# Patient Record
Sex: MALE | Race: WHITE | Employment: FULL TIME | URBAN - METROPOLITAN AREA
[De-identification: names, ages, dates, MRNs, and addresses within clinical notes are randomized per-mention and may not be internally consistent; named-entity substitution may affect disease eponyms.]

---

## 2019-03-01 ENCOUNTER — HOSPITAL ENCOUNTER (EMERGENCY)
Facility: CLINIC | Age: 54
Discharge: HOME | End: 2019-03-01
Attending: PHYSICIAN ASSISTANT | Admitting: PHYSICIAN ASSISTANT
Payer: COMMERCIAL

## 2019-03-01 ENCOUNTER — APPOINTMENT (OUTPATIENT)
Dept: CT IMAGING | Facility: CLINIC | Age: 54
End: 2019-03-01
Attending: PHYSICIAN ASSISTANT
Payer: COMMERCIAL

## 2019-03-01 VITALS
DIASTOLIC BLOOD PRESSURE: 70 MMHG | HEIGHT: 78 IN | RESPIRATION RATE: 14 BRPM | SYSTOLIC BLOOD PRESSURE: 112 MMHG | OXYGEN SATURATION: 96 % | TEMPERATURE: 98.7 F | HEART RATE: 144 BPM | BODY MASS INDEX: 24.3 KG/M2 | WEIGHT: 210 LBS
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
## 2019-03-01 DIAGNOSIS — S09.90XA HEAD INJURY, INITIAL ENCOUNTER: ICD-10-CM

## 2019-03-01 DIAGNOSIS — S01.111A COMPLEX LACERATION OF RIGHT EYEBROW, INITIAL ENCOUNTER: ICD-10-CM

## 2019-03-01 PROCEDURE — 93005 ELECTROCARDIOGRAM TRACING: CPT | Mod: 59

## 2019-03-01 PROCEDURE — 70450 CT HEAD/BRAIN W/O DYE: CPT

## 2019-03-01 PROCEDURE — 90471 IMMUNIZATION ADMIN: CPT

## 2019-03-01 PROCEDURE — 25000128 H RX IP 250 OP 636: Performed by: PHYSICIAN ASSISTANT

## 2019-03-01 PROCEDURE — 90715 TDAP VACCINE 7 YRS/> IM: CPT | Performed by: PHYSICIAN ASSISTANT

## 2019-03-01 PROCEDURE — 12013 RPR F/E/E/N/L/M 2.6-5.0 CM: CPT

## 2019-03-01 PROCEDURE — 99284 EMERGENCY DEPT VISIT MOD MDM: CPT | Mod: 25

## 2019-03-01 RX ORDER — GABAPENTIN 100 MG/1
100 CAPSULE ORAL 3 TIMES DAILY
COMMUNITY

## 2019-03-01 RX ORDER — TRAMADOL HYDROCHLORIDE 50 MG/1
100 TABLET ORAL 2 TIMES DAILY
COMMUNITY

## 2019-03-01 RX ORDER — CEPHALEXIN 500 MG/1
500 CAPSULE ORAL 4 TIMES DAILY
Qty: 20 CAPSULE | Refills: 0 | Status: ON HOLD | OUTPATIENT
Start: 2019-03-01 | End: 2019-06-10

## 2019-03-01 RX ORDER — DULOXETIN HYDROCHLORIDE 60 MG/1
60 CAPSULE, DELAYED RELEASE ORAL DAILY
COMMUNITY
End: 2019-06-07

## 2019-03-01 RX ADMIN — CLOSTRIDIUM TETANI TOXOID ANTIGEN (FORMALDEHYDE INACTIVATED), CORYNEBACTERIUM DIPHTHERIAE TOXOID ANTIGEN (FORMALDEHYDE INACTIVATED), BORDETELLA PERTUSSIS TOXOID ANTIGEN (GLUTARALDEHYDE INACTIVATED), BORDETELLA PERTUSSIS FILAMENTOUS HEMAGGLUTININ ANTIGEN (FORMALDEHYDE INACTIVATED), BORDETELLA PERTUSSIS PERTACTIN ANTIGEN, AND BORDETELLA PERTUSSIS FIMBRIAE 2/3 ANTIGEN 0.5 ML: 5; 2; 2.5; 5; 3; 5 INJECTION, SUSPENSION INTRAMUSCULAR at 16:33

## 2019-03-01 ASSESSMENT — ENCOUNTER SYMPTOMS
LIGHT-HEADEDNESS: 1
NECK PAIN: 0
VOMITING: 0
MYALGIAS: 0
NECK STIFFNESS: 0
FEVER: 0
HEADACHES: 1
ARTHRALGIAS: 0
WOUND: 1
NAUSEA: 0

## 2019-03-01 ASSESSMENT — MIFFLIN-ST. JEOR: SCORE: 1925.8

## 2019-03-01 NOTE — ED PROVIDER NOTES
History     Chief Complaint:  Fall     HPI   Nigel Diaz is a 53 year old male with a history of atrial fibrillation, not anticoagulated, who presents to the emergency department today for evaluation and assessment in the setting of a recent head laceration. The patient reports that this occurred approximately 15 hours ago, around 2200 yesterday evening when he was walking home from dinner and slipped on a patch of ice resulting in a fall and laceration to his right eyebrow. The patient did not sustain a loss of conscious and denies further injury, but has since had a constant headache. He notes that he was not aware of the swelling or overall severity of this injury until 5+ hours after this incident, and this morning flew home from [place] prior to presenting here for evaluation. Here the patient reports ongoing headache and lightheadedness but has not been nauseous or confused. No vomiting or blurred vision, although he reports that he was unable to put his contacts in today due to excessive swelling in his right eye. The patient is otherwise healthy and sure footed at baseline. He is not on blood thinners and denies any further symptoms or concerns at this time. The patient has been taking ibuprofen for symptom management at home and last took this yesterday evening. Latest tetanus shot dated 2/3/09.     Allergies:  No known drug allergies    Medications:    The patient is not currently taking any prescribed medications.     Past Medical History:    Atrial fibrillation   GERD  Chondromalacia of patella  ETOH abuse     Past Surgical History:    Cystectomy branchial cleft   Removal of sperm ducts     Family History:    Thyroid disease   Heart disease, father     Social History:  The patient was accompanied to the ED by family.  Smoking Status: Never  Smokeless Tobacco: Never  Alcohol Use: Yes  Marital Status:   [2]    Review of Systems   Constitutional: Negative for fever.   Eyes: Negative for visual  "disturbance.   Gastrointestinal: Negative for nausea and vomiting.   Musculoskeletal: Negative for arthralgias, myalgias, neck pain and neck stiffness.   Skin: Positive for wound.   Neurological: Positive for light-headedness and headaches. Negative for syncope.   All other systems reviewed and are negative.    Physical Exam     Patient Vitals for the past 24 hrs:   BP Temp Temp src Pulse Resp SpO2 Height Weight   03/01/19 1749 112/70 -- -- 144 -- -- -- --   03/01/19 1531 143/89 98.7  F (37.1  C) Oral 118 14 96 % 1.981 m (6' 6\") 95.3 kg (210 lb)     Physical Exam  General: Alert, interactive. Well appearing, in no distress.   Head:  4 cm full thickness horizontal laceration above right eyebrow with surrounding periorbital swelling and bruising.     Eyes:  EOM intact. The pupils are equal, round, and reactive to light. No scleral icterus.  ENT:                                      Ears:  The external ears are normal. No hemotympanum.   Nose:  The external nose is normal.  Throat:  The oropharynx is normal. Mucus membranes are moist.                 Neck:  Normal range of motion. There is no rigidity.   CV:  tachycardic rate and regular hythm. No murmur. 2+ radial pulses  Resp:  Breath sounds are clear bilaterally. Non-labored, no retractions or accessory muscle use.  GI:  Abdomen is soft, no distension, no tenderness.   MS:  Normal range of motion. No midline cervical, thoracic, lumbar tenderness.   Skin:  Warm and dry.   Neuro:  Strength and sensation grossly intact.   Psych:  Awake. Alert.  Appropriate interactions.     Emergency Department Course     ECG (17:58:01):  Rate 105 bpm. ME interval 172. QRS duration 82. QT/QTc 330/436. P-R-T axes 64 63 43.  Sinus tachycardia.   Interpreted at 1803 by Jero Roblero MD.     Imaging:  Radiology findings were communicated with the patient who voiced understanding of the findings.    Head CT w/o Contrast:  IMPRESSION:     1. No evidence of acute intracranial hemorrhage, " mass, or herniation.  2. Soft tissue injury over the right supraorbital region without  underlying skull fracture.    ALYSE JEFFERSON MD    Procedures:  Laceration Repair Procedure Note:    Performed by: Linda Lu PA-C  Consent given by: Patient who states understanding of the procedure being performed after discussing the risks, benefits and alternatives.    Preparation: Patient was prepped and draped in usual sterile fashion.  Irrigation solution: Saline and shur clens.   Body area: Above right eyebrow   Laceration length: 4cm  Contamination: The wound is minimally contaminated.  Foreign bodies:none  Tendon involvement: none  Anesthesia: Local  Local anesthetic: Lidocaine 1%, with epinephrine  Anesthetic total: 3ml  Debridement: none  Skin closure: Closed with 6 x 6.0 nylon sutures  Technique: interrupted  Approximation: close   Approximation difficulty: simple    Interventions:  1633 - Tdap injection 0.5 mL Intramuscular given     Emergency Department Course:  Nursing notes and vitals reviewed.    1551: I performed an exam of the patient as documented above.     1607: Patient rechecked and updated.      1610: I shared services with my colleague Dr. Anton Gibbs, who met with the patient at this time.     1624: Local anesthesia applied and patient prepped for laceration repair as documented.    1638: Patient rechecked and updated.  Laceration repaired as above.     1746: Imaging results reviewed. Patient rechecked and updated.       1753: Patient rechecked and updated. Patient's HR remains elevated. Will order an EKG prior to discharge.     Findings and plan explained to the Patient. Patient discharged home with instructions regarding supportive care, medications, and reasons to return. The importance of close follow-up was reviewed.     Impression & Plan      Medical Decision Making:   Nigel Diaz is a 53 year old male who presents for evaluation of a facial laceration and head injury.  Differential  "diagnosis includes intracranial injuries such as skull fracture, epidural hematoma, subdural hematoma, traumatic subarachnoid hemorrhage.  CT completed that was fortunately normal with no acute findings.  Patient symptoms consistent with concussion and discussed this with the patient and family at length.  Referral for the concussion clinic provided.  Discussed importance of avoiding a repeat head injury in the next 1-2 weeks or until symptom-free.  Patient voices understanding.  Recommended Tylenol and ibuprofen as needed for pain.  Cervical spine is cleared clinically.  The head to toe trauma is exam is negative otherwise and further trauma workup is not necessary.   The wound was carefully evaluated and explored.  The laceration was closed with sutures as noted above; achieved good approximation of the wound edges.  Patient understands that he will have a scar, and discussed scar reduction techniques.  Suture removal in 5 days.  Given the wound was closed approximately 15 hours after occurance, risk of infection is increased and will start on Keflex prophylactically.  Wound care discussed and plan to follow-up with primary care provider in 5 days for suture removal.  Return precautions discussed  Including \"red flag\" symptoms including vomiting, vision changes, memory loss, confusion, seizures, focal neurologic symptoms, or any other new/concerning symptoms.  Also discussed importance of returning for signs of infection to the wound including spreading redness, fevers, or purulent drainage.  Patient and family agree with the plan and all questions and concerns addressed prior to discharge home. Tetanus updated.     Upon discharge, patient was noted to have a heart rate in the 130s.  EKG completed that showed sinus tachycardia at rate of 105, no sign of atrial fibrillation or other arrhythmia.  He is asymptotic with no chest pain or palpations. Recommended follow-up closely with his primary care provider who has a " good relationship with.    Diagnosis:    ICD-10-CM    1. Head injury, initial encounter S09.90XA CONCUSSION  REFERRAL   2. Fall, initial encounter W19.XXXA        Disposition:  discharged to home    Discharge Medications:     Medication List      Started    cephALEXin 500 MG capsule  Commonly known as:  KEFLEX  500 mg, Oral, 4 TIMES DAILY            Catrina Gallego  3/1/2019    EMERGENCY DEPARTMENT  ICatrina, ulises serving as a scribe at 3:51 PM on 3/1/2019 to document services personally performed by Linda Lu PA-C based on my observations and the provider's statements to me.       Linda Lu PA-C  03/01/19 1828

## 2019-03-01 NOTE — ED PROVIDER NOTES
Emergency Department Attending Supervision Note  3/1/2019  4:05 PM      I evaluated this patient in conjunction with Linda Lu PA-C      Briefly, the patient presented with a head laceration after a slip and fall on ice last night around 2200. The patient reports last night, he was in [place] for business when he was walking between parking lots when he slipped and fell causing a laceration over his right eyebrow. He has drinking alcohol last night. This morning, he was able to make it home to [place] and has been experiencing a mild headache, but was concerned about the laceration prompting his visit to the emergency department. He denies loss of consciousness, visual changes, nausea, vomiting, severe headache, double vision, neck pain, and back pain. Of note, his last tetanus was in 2009.    Exam:   General: Alert, appears well-developed and well-nourished. Cooperative.     In mild distress  HEENT:  Head:  Right forehead contusion and right periorbital swelling and bruising. Right eyebrow laceration.   Ears:  External ears are normal  Mouth/Throat:  Oropharynx is without erythema or exudate and mucous membranes are dry.   Eyes:   Conjunctivae normal and EOM are normal. No scleral icterus.    Pupils are equal, round, and reactive to light.   CV:  Normal rate, regular rhythm, normal heart sounds and radial pulses are 2+ and symmetric.  No murmur.  Resp:  Breath sounds are clear bilaterally    Non-labored, no retractions or accessory muscle use  GI:  Abdomen is soft, no distension, no tenderness. No rebound or guarding.  No CVA tenderness bilaterally  MS:  Normal range of motion. No edema.    Normal strength in all 4 extremities.     Back atraumatic.    No midline cervical, thoracic, or lumbar tenderness  Skin:  Warm and dry.  No rash or lesions noted.  Neuro: Alert. Normal strength.  GCS: 15  Psych:  Normal mood and affect.  Head CT w/o contrast   Final Result   IMPRESSION:      1. No evidence of acute  "intracranial hemorrhage, mass, or herniation.   2. Soft tissue injury over the right supraorbital region without   underlying skull fracture.         ALYSE JEFFERSON MD            MDM:   Nigel Diaz is a 53 year old male who presents with a head injury and head laceration. Based on this patient's initial presentation, I was concerned about possible intracranial injuries (e.g. skull fracture, epidural hematoma, subdural hematoma, intracerebral hemorrhage, and traumatic subarachnoid hemorrhage).  CT imaging was obtained and fortunately was normal.  At this time it appears that the patient's symptoms are due to a concussion.  The patient/family understand that they must return if any \"red flags\" appear/develop in the coming hours/days, as this may represent an indication to perform a repeat CT scan or further evaluation.  I have noted that \"red flags\" include: headaches that get worse, increased drowsiness, strange behavior, repetitive speech, seizures, repeated vomiting, growing confusion, increased irritability, slurred speech, weakness or numbness, and loss of responsiveness.  Patient did have a laceration to his right eyebrow and given the injury being 12 hours out, the laceration was repaired due to cosmetic location and nature of injury. Will plan to send home with oral antibiotics due to risk of infection and due to delayed repair of the injury.  We discussed possible complications with repair including infection, poor cosmetic outcome, etc.  Referred to concussion clinic. This information will also be provided in writing at discharge.  I have discussed the second impact syndrome, and the importance of not sustaining repeated concussion in the next 1-2 weeks.  Post concussive syndrome is also discussed. The patient's questions have been answered. They have a responsible adult to accompany them home.  Tetanus Updated today.    I agree with the medical decision making and plan described in MANN Lemus's " note.    Diagnosis    ICD-10-CM    1. Head injury, initial encounter S09.90XA CONCUSSION  REFERRAL   2. Fall, initial encounter W19.XXXA    3. Complex laceration of right eyebrow, initial encounter S01.111A          Scribe Disclosure:  BARBARA, Jonnathan Reeves, am serving as a scribe at 5:02 PM on 3/1/2019 to document services personally performed by Anton Gibbs MD based on my observations and the provider's statements to me.              Anton Gibbs MD  03/01/19 0185

## 2019-03-01 NOTE — DISCHARGE INSTRUCTIONS
*Suture removal in 5 days.     Discharge Instructions  Head Injury    You have been seen today for a head injury. Your evaluation included a history and physical examination. You may have had a CT (CAT) scan performed, though most head injuries do not require a scan. Based on this evaluation, your provider today does not feel that your head injury is serious.    Generally, every Emergency Department visit should have a follow-up clinic visit with either a primary or a specialty clinic/provider. Please follow-up as instructed by your emergency provider today.  Return to the Emergency Department if:  You are confused or you are not acting right.  Your headache gets worse or you start to have a really bad headache even with your recommended treatment plan.  You vomit (throw up) more than once.  You have a seizure.  You have trouble walking.  You have weakness or paralysis (cannot move) in an arm or a leg.  You have blood or fluid coming from your ears or nose.  You have new symptoms or anything that worries you.    Sleeping:  It is okay for you to sleep, but someone should wake you up if instructed by your provider, and someone should check on you at your usual time to wake up.     Activity:  Do not drive for at least 24 hours.  Do not drive if you have dizzy spells or trouble concentrating, or remembering things.  Do not return to any contact sports until cleared by your regular provider.     MORE INFORMATION:    Concussion:  A concussion is a minor head injury that may cause temporary problems with the way the brain works. Although concussions are important, they are generally not an emergency or a reason that a person needs to be hospitalized. Some concussion symptoms include confusion, amnesia (forgetful), nausea (sick to your stomach) and vomiting (throwing up), dizziness, fatigue, memory or concentration problems, irritability and sleep problems. For most people, concussions are mild and temporary but some will  have more severe and persistent symptoms that require on-going care and treatment.  CT Scans: Your evaluation today may have included a CT scan (CAT scan) to look for things like bleeding or a skull fracture (broken bone).  CT scans involve radiation and too many CT scans can cause serious health problems like cancer, especially in children.  Because of this, your provider may not have ordered a CT scan today if they think you are at low risk for a serious or life threatening problem.    If you were given a prescription for medicine here today, be sure to read all of the information (including the package insert) that comes with your prescription.  This will include important information about the medicine, its side effects, and any warnings that you need to know about.  The pharmacist who fills the prescription can provide more information and answer questions you may have about the medicine.  If you have questions or concerns that the pharmacist cannot address, please call or return to the Emergency Department.     Remember that you can always come back to the Emergency Department if you are not able to see your regular provider in the amount of time listed above, if you get any new symptoms, or if there is anything that worries you.      Discharge Instructions  Laceration (Cut)    You were seen today for a laceration (cut).  Your provider examined your laceration for any problems such a buried foreign body (like glass, a splinter, or gravel), or injury to blood vessels, tendons, and nerves.  Your provider may have also rinsed and/or scrubbed your laceration to help prevent an infection. It may not be possible to find all problems with your laceration on the first visit; occasionally foreign bodies or a tendon injury can go undetected.    Your laceration may have been closed in one of several ways:  No closure: many wounds will heal just fine without closure.  Stitches: regular stitches that require  removal.  Staples: skin staples are often used in the scalp/head.  Wound adhesive (glue): skin glue can be used for certain lacerations and doesn?t require removal.  Wound strips (aka Butterfly bandages or steri-strips): these are bandages that help to close a wound.  Absorbable stitches: ?dissolving? stitches that go away on their own and usually don?t require removal.    A small percentage of wounds will develop an infection regardless of how well the wound is cared for. Antibiotics are generally not indicated to prevent an infection so are only given for a small number of high-risk wounds. Some lacerations are too high risk to close, and are left open to heal because closure can increase the likelihood that an infection will develop.    Remember that all lacerations, no matter how expertly repaired, will cause scarring. We consider many factors, techniques, and materials, in our efforts to provide the best possible cosmetic outcome.    Generally, every Emergency Department visit should have a follow-up clinic visit with either a primary or a specialty clinic/provider. Please follow-up as instructed by your emergency provider today.     Return to the Emergency Department right away if:  You have more redness, swelling, pain, drainage (pus), a bad smell, or red streaking from your laceration as these symptoms could indicate an infection.  You have a fever of 100.4 F or more.  You have bleeding that you cannot stop at home. If your cut starts to bleed, hold pressure on the bleeding area with a clean cloth or put pressure over the bandage.  If the bleeding does not stop after using constant pressure for 30 minutes, you should return to the Emergency Department for further treatment.  An area past the laceration is cool, pale, or blue compared with the other side, or has a slower return of color when squeezed.  Your dressing seems too tight or starts to get uncomfortable or painful. For children, signs of a problem  might be irritability or restlessness.  You have loss of normal function or use of an area, such as being unable to straighten or bend a finger normally.  You have a numb area past the laceration.    Return to the Emergency Department or see your regular provider if:  The laceration starts to come open.   You have something coming out of the cut or a feeling that there is something in the laceration.  Your wound will not heal, or keeps breaking open. There can always be glass, wood, dirt or other things in any wound.  They will not always show up, even on x-rays.  If a wound does not heal, this may be why, and it is important to follow-up with your regular provider.    Home Care:  Take your dressing off in 12-24 hours, or as instructed by your provider, to check your laceration. Remove the dressing sooner if it seems too tight or painful, or if it is getting numb, tingly, or pale past the dressing.  Gently wash your laceration 1-2 times daily with clean water and mild soap. It is okay to shower or run clean water over the laceration, but do not let the laceration soak in water (no swimming).  If your laceration was closed with wound adhesive or strips: pat it dry and leave it open to the air. For all other repairs: after you wash your laceration, or at least 2 times a day, apply antibiotic ointment (such as Neosporin  or Bacitracin ) to the laceration, then cover it with a Band-Aid  or gauze.  Keep the laceration clean. Wear gloves or other protective clothing if you are around dirt.    Follow-up for removal:  If your wound was closed with staples or regular stitches, they need to be removed according to the instructions and timeline specified by your provider today.  If your wound was closed with absorbable (?dissolving?) sutures, they should fall out, dissolve, or not be visible in about one week. If they are still visible, then they should be removed according to the instructions and timeline specified by your  provider today.    Scars:  To help minimize scarring:  Wear sunscreen over the healed laceration when out in the sun.  Massage the area regularly once healed.  You may apply Vitamin E to the healed wound.  Wait. Scars improve in appearance over months and years.    If you were given a prescription for medicine here today, be sure to read all of the information (including the package insert) that comes with your prescription.  This will include important information about the medicine, its side effects, and any warnings that you need to know about.  The pharmacist who fills the prescription can provide more information and answer questions you may have about the medicine.  If you have questions or concerns that the pharmacist cannot address, please call or return to the Emergency Department.       Remember that you can always come back to the Emergency Department if you are not able to see your regular provider in the amount of time listed above, if you get any new symptoms, or if there is anything that worries you.

## 2019-03-01 NOTE — ED AVS SNAPSHOT
Emergency Department  [address] [place] [id number]  Phone:  [phone]  Fax:  [phone]                                    Nigel Diaz   MRN: [id number]    Department:   Emergency Department   Date of Visit:  3/1/2019           After Visit Summary Signature Page    I have received my discharge instructions, and my questions have been answered. I have discussed any challenges I see with this plan with the nurse or doctor.    ..........................................................................................................................................  Patient/Patient Representative Signature      ..........................................................................................................................................  Patient Representative Print Name and Relationship to Patient    ..................................................               ................................................  Date                                   Time    ..........................................................................................................................................  Reviewed by Signature/Title    ...................................................              ..............................................  Date                                               Time          22EPIC Rev 08/18

## 2019-03-01 NOTE — ED NOTES
At discharge pt's HR noted to be consistently 140's. Pulse was regular and strong. TACHO DARNELL notified and EKG ordered.

## 2019-03-02 LAB — INTERPRETATION ECG - MUSE: NORMAL

## 2019-03-28 ENCOUNTER — HOSPITAL ENCOUNTER (EMERGENCY)
Facility: CLINIC | Age: 54
Discharge: HOME | End: 2019-03-28
Attending: EMERGENCY MEDICINE | Admitting: EMERGENCY MEDICINE
Payer: COMMERCIAL

## 2019-03-28 VITALS
BODY MASS INDEX: 25.45 KG/M2 | SYSTOLIC BLOOD PRESSURE: 117 MMHG | RESPIRATION RATE: 16 BRPM | HEART RATE: 89 BPM | TEMPERATURE: 98.4 F | OXYGEN SATURATION: 95 % | DIASTOLIC BLOOD PRESSURE: 77 MMHG | HEIGHT: 78 IN | WEIGHT: 220 LBS

## 2019-03-28 DIAGNOSIS — F10.220 ACUTE ALCOHOLIC INTOXICATION IN ALCOHOLISM WITHOUT COMPLICATION (H): ICD-10-CM
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
## 2019-03-28 LAB — ALCOHOL BREATH TEST: 0.33 (ref 0–0.01)

## 2019-03-28 PROCEDURE — 99283 EMERGENCY DEPT VISIT LOW MDM: CPT

## 2019-03-28 PROCEDURE — 82075 ASSAY OF BREATH ETHANOL: CPT

## 2019-03-28 ASSESSMENT — ENCOUNTER SYMPTOMS
NECK PAIN: 0
HEADACHES: 0

## 2019-03-28 ASSESSMENT — MIFFLIN-ST. JEOR: SCORE: 1971.16

## 2019-03-28 NOTE — ED AVS SNAPSHOT
Emergency Department  [address] [place] [id number]  Phone:  [phone]  Fax:  [phone]                                    Nigel Diaz   MRN: [id number]    Department:   Emergency Department   Date of Visit:  3/28/2019           After Visit Summary Signature Page    I have received my discharge instructions, and my questions have been answered. I have discussed any challenges I see with this plan with the nurse or doctor.    ..........................................................................................................................................  Patient/Patient Representative Signature      ..........................................................................................................................................  Patient Representative Print Name and Relationship to Patient    ..................................................               ................................................  Date                                   Time    ..........................................................................................................................................  Reviewed by Signature/Title    ...................................................              ..............................................  Date                                               Time          22EPIC Rev 08/18

## 2019-03-29 NOTE — ED PROVIDER NOTES
"  History     Chief Complaint:  Alcohol intoxication    HPI   Nigel Diaz is a 53 year old male with a history of alcohol abuse who presents with alcohol intoxication. The patient states that he was traveling home from [place] today and had 8 alcoholic beverages by the time he arrived at the [place] airport. He stumbled ad fell getting off the flight and fell and EMS was called, he is not sure if he hit his head. Here the patient is intoxicated, but denies any neck pain, headache, or loss of consciousness. The patient states his last time drinking was a couple weeks ago. He also took his prescribed tramadol and gabapentin today. The patient has been in outpatient treatment at [facility].       Allergies:  No known allergies     Medications:    Cymbalta  Neurontin  Ibuprofen  Ultram     Past Medical History:    Atrial fibrillation   GERD  ETOH abuse    Past Surgical History:    Cystectomy branchial cleft  Removal of sperm ducts    Family History:    Thyroid disease  Heart disease    Social History:  Patient presents alone  Negative for tobacco use.  Positive for alcohol use.   Marital Status:       Review of Systems   Musculoskeletal: Negative for neck pain.   Neurological: Negative for syncope and headaches.   All other systems reviewed and are negative.        Physical Exam     Patient Vitals for the past 24 hrs:   BP Temp Temp src Heart Rate Resp SpO2 Height Weight   03/28/19 2020 131/79 98.4  F (36.9  C) Oral 124 14 94 % 1.981 m (6' 6\") 99.8 kg (220 lb)         Physical Exam  Nursing note and vitals reviewed.    Constitutional:  Intoxicated. Slightly slurred speech.     HENT:    Nose normal.  No discharge.  No evidence of facial or scalp trauma.  Scar on his right forehead from recent fall.     Oropharynx is clear and moist.  Eyes:    Conjunctivae are normal without injection. No lid droop.     Pupils are equal, round, and reactive to light.      Right eye exhibits no discharge. Left eye exhibits no " discharge.      No scleral icterus. Eyes bloodshot.   Cardiovascular:  Normal rate, regular rhythm with normal S1 and S2.      Normal heart sounds and peripheral pulses 2+ and equal.       No murmur or mark.  Pulmonary:  Effort normal and breath sounds clear to auscultation bilaterally   No respiratory distress.  No stridor.     No wheezes. No rales.     GI:    Soft. No distension and no mass. No tenderness.      No rebound and no guarding.   Musculoskeletal:  Normal range of motion. No extremity deformity.                                      Neck supple, no midline spinal tenderness.   Neurological:   Alert and oriented. No cranial nerve deficit, no facial droop.     Exhibits good muscle tone.  Gait is steady.     GCS eye subscore is 4. GCS verbal subscore is 5.      GCS motor subscore is 6.   Skin:    Skin is warm and dry. No rash noted. No diaphoresis.      No erythema. No pallor.  No lesions.  Psychiatric:   Behavior is normal.  Intoxicated.  Emergency Department Course   Laboratory:  Alcohol breath test: 0.332    Emergency Department Course:  Nursing notes and vitals reviewed.  2043: I performed an exam of the patient as documented above.     2201: I rechecked the patient. Explained findings to patient. He was able to ambulate and plan is for him to call his wife and see if she can drive him home. Plan explained to the Patient. Patient discharged home with instructions regarding supportive care, medications, and reasons to return. The importance of close follow-up was reviewed.     Impression & Plan    Medical Decision Making:  Patient comes in intoxicated with a breathalyzer of 0.332.  He has a chronic history of alcohol abuse and has fallen in the past, the most recent earlier this month at the airport.  He has no evidence of an injury here and I cleared his C-spine as he had no evidence that he had struck his head and he has no pain.  The patient was able to get up and ambulate without assistance safely  in the emergency room.  He is refusing detox, he has been through [facility] in the past and will try to contact them in the morning.  He will be calling his wife to come and get him as his sober .    Diagnosis:    ICD-10-CM    1. Acute alcoholic intoxication in alcoholism without complication (H) F10.220    2. Fall, initial encounter W19.XXXA        Disposition:  discharged to home  Cut back on your drinking, contact your sponsor tomorrow, contact [facility] about getting back in for some treatment and help with your drinking.         I, Giovanny Barron, am serving as a scribe on 3/28/2019 at 8:43 PM to personally document services performed by Drea Navarro MD based on my observations and the provider's statements to me.         Giovanny Barron  3/28/2019    EMERGENCY DEPARTMENT       Drea Navarro MD  03/28/19 0166

## 2019-03-29 NOTE — ED NOTES
Pt wife, Radha ([phone]) contacted. Willing to come pick patient up. States that she is approx. 1 hour away and will be leaving now.

## 2019-03-29 NOTE — DISCHARGE INSTRUCTIONS
Cut back on your drinking, contact your sponsor tomorrow, contact [facility] about getting back in for some treatment and help with your drinking.

## 2019-06-07 ENCOUNTER — HOSPITAL ENCOUNTER (INPATIENT)
Facility: CLINIC | Age: 54
LOS: 2 days | Discharge: HOME | DRG: 310 | End: 2019-06-10
Attending: EMERGENCY MEDICINE | Admitting: INTERNAL MEDICINE
Payer: COMMERCIAL

## 2019-06-07 DIAGNOSIS — I48.91 ATRIAL FIBRILLATION WITH RVR (H): ICD-10-CM

## 2019-06-07 DIAGNOSIS — F10.10 ALCOHOL ABUSE: ICD-10-CM

## 2019-06-07 LAB
ANION GAP SERPL CALCULATED.3IONS-SCNC: 10 MMOL/L (ref 3–14)
BASOPHILS # BLD AUTO: 0 10E9/L (ref 0–0.2)
BASOPHILS NFR BLD AUTO: 0.4 %
BUN SERPL-MCNC: 22 MG/DL (ref 7–30)
CALCIUM SERPL-MCNC: 9.7 MG/DL (ref 8.5–10.1)
CHLORIDE SERPL-SCNC: 93 MMOL/L (ref 94–109)
CO2 SERPL-SCNC: 31 MMOL/L (ref 20–32)
CREAT SERPL-MCNC: 1.05 MG/DL (ref 0.66–1.25)
D DIMER PPP FEU-MCNC: 0.4 UG/ML FEU (ref 0–0.5)
DIFFERENTIAL METHOD BLD: NORMAL
EOSINOPHIL # BLD AUTO: 0.1 10E9/L (ref 0–0.7)
EOSINOPHIL NFR BLD AUTO: 0.8 %
ERYTHROCYTE [DISTWIDTH] IN BLOOD BY AUTOMATED COUNT: 12.3 % (ref 10–15)
GFR SERPL CREATININE-BSD FRML MDRD: 80 ML/MIN/{1.73_M2}
GLUCOSE SERPL-MCNC: 95 MG/DL (ref 70–99)
HCT VFR BLD AUTO: 47.8 % (ref 40–53)
HGB BLD-MCNC: 16.6 G/DL (ref 13.3–17.7)
IMM GRANULOCYTES # BLD: 0 10E9/L (ref 0–0.4)
IMM GRANULOCYTES NFR BLD: 0.2 %
LYMPHOCYTES # BLD AUTO: 4 10E9/L (ref 0.8–5.3)
LYMPHOCYTES NFR BLD AUTO: 47.9 %
MCH RBC QN AUTO: 31.7 PG (ref 26.5–33)
MCHC RBC AUTO-ENTMCNC: 34.7 G/DL (ref 31.5–36.5)
MCV RBC AUTO: 91 FL (ref 78–100)
MONOCYTES # BLD AUTO: 0.8 10E9/L (ref 0–1.3)
MONOCYTES NFR BLD AUTO: 9.8 %
NEUTROPHILS # BLD AUTO: 3.4 10E9/L (ref 1.6–8.3)
NEUTROPHILS NFR BLD AUTO: 40.9 %
NRBC # BLD AUTO: 0 10*3/UL
NRBC BLD AUTO-RTO: 0 /100
PLATELET # BLD AUTO: 223 10E9/L (ref 150–450)
POTASSIUM SERPL-SCNC: 3.1 MMOL/L (ref 3.4–5.3)
RBC # BLD AUTO: 5.23 10E12/L (ref 4.4–5.9)
SODIUM SERPL-SCNC: 134 MMOL/L (ref 133–144)
TROPONIN I SERPL-MCNC: <0.015 UG/L (ref 0–0.04)
WBC # BLD AUTO: 8.4 10E9/L (ref 4–11)

## 2019-06-07 PROCEDURE — 85379 FIBRIN DEGRADATION QUANT: CPT | Performed by: EMERGENCY MEDICINE

## 2019-06-07 PROCEDURE — 25000125 ZZHC RX 250: Performed by: EMERGENCY MEDICINE

## 2019-06-07 PROCEDURE — 96365 THER/PROPH/DIAG IV INF INIT: CPT

## 2019-06-07 PROCEDURE — 93005 ELECTROCARDIOGRAM TRACING: CPT

## 2019-06-07 PROCEDURE — 25000128 H RX IP 250 OP 636: Performed by: EMERGENCY MEDICINE

## 2019-06-07 PROCEDURE — 25800030 ZZH RX IP 258 OP 636: Performed by: EMERGENCY MEDICINE

## 2019-06-07 PROCEDURE — 25000132 ZZH RX MED GY IP 250 OP 250 PS 637: Performed by: EMERGENCY MEDICINE

## 2019-06-07 PROCEDURE — 96366 THER/PROPH/DIAG IV INF ADDON: CPT

## 2019-06-07 PROCEDURE — 83735 ASSAY OF MAGNESIUM: CPT | Performed by: EMERGENCY MEDICINE

## 2019-06-07 PROCEDURE — 99285 EMERGENCY DEPT VISIT HI MDM: CPT | Mod: 25

## 2019-06-07 PROCEDURE — 85025 COMPLETE CBC W/AUTO DIFF WBC: CPT | Performed by: EMERGENCY MEDICINE

## 2019-06-07 PROCEDURE — 80048 BASIC METABOLIC PNL TOTAL CA: CPT | Performed by: EMERGENCY MEDICINE

## 2019-06-07 PROCEDURE — 99220 ZZC INITIAL OBSERVATION CARE,LEVL III: CPT | Performed by: INTERNAL MEDICINE

## 2019-06-07 PROCEDURE — G0378 HOSPITAL OBSERVATION PER HR: HCPCS

## 2019-06-07 PROCEDURE — 96361 HYDRATE IV INFUSION ADD-ON: CPT

## 2019-06-07 PROCEDURE — 84484 ASSAY OF TROPONIN QUANT: CPT | Performed by: EMERGENCY MEDICINE

## 2019-06-07 RX ORDER — SODIUM CHLORIDE 9 MG/ML
1000 INJECTION, SOLUTION INTRAVENOUS CONTINUOUS
Status: DISCONTINUED | OUTPATIENT
Start: 2019-06-07 | End: 2019-06-08 | Stop reason: DRUGHIGH

## 2019-06-07 RX ORDER — POTASSIUM CHLORIDE 1500 MG/1
40 TABLET, EXTENDED RELEASE ORAL ONCE
Status: COMPLETED | OUTPATIENT
Start: 2019-06-07 | End: 2019-06-07

## 2019-06-07 RX ORDER — DULOXETIN HYDROCHLORIDE 30 MG/1
30 CAPSULE, DELAYED RELEASE ORAL 2 TIMES DAILY
COMMUNITY

## 2019-06-07 RX ORDER — DILTIAZEM HYDROCHLORIDE 30 MG/1
30 TABLET, FILM COATED ORAL EVERY 6 HOURS SCHEDULED
Status: CANCELLED | OUTPATIENT
Start: 2019-06-08

## 2019-06-07 RX ORDER — MULTIPLE VITAMINS W/ MINERALS TAB 9MG-400MCG
1 TAB ORAL DAILY
COMMUNITY

## 2019-06-07 RX ORDER — DILTIAZEM HYDROCHLORIDE 5 MG/ML
20 INJECTION INTRAVENOUS ONCE
Status: COMPLETED | OUTPATIENT
Start: 2019-06-07 | End: 2019-06-07

## 2019-06-07 RX ORDER — DILTIAZEM HYDROCHLORIDE 30 MG/1
90 TABLET, FILM COATED ORAL ONCE
Status: COMPLETED | OUTPATIENT
Start: 2019-06-07 | End: 2019-06-07

## 2019-06-07 RX ORDER — ASPIRIN 81 MG/1
324 TABLET, CHEWABLE ORAL ONCE
Status: COMPLETED | OUTPATIENT
Start: 2019-06-07 | End: 2019-06-07

## 2019-06-07 RX ORDER — LIDOCAINE 40 MG/G
CREAM TOPICAL
Status: DISCONTINUED | OUTPATIENT
Start: 2019-06-07 | End: 2019-06-07

## 2019-06-07 RX ADMIN — SODIUM CHLORIDE 1000 ML: 9 INJECTION, SOLUTION INTRAVENOUS at 19:55

## 2019-06-07 RX ADMIN — DILTIAZEM HYDROCHLORIDE 5 MG/HR: 5 INJECTION INTRAVENOUS at 21:40

## 2019-06-07 RX ADMIN — DILTIAZEM HYDROCHLORIDE 90 MG: 30 TABLET, FILM COATED ORAL at 19:54

## 2019-06-07 RX ADMIN — SODIUM CHLORIDE 1000 ML: 9 INJECTION, SOLUTION INTRAVENOUS at 18:27

## 2019-06-07 RX ADMIN — POTASSIUM CHLORIDE 40 MEQ: 1500 TABLET, EXTENDED RELEASE ORAL at 21:08

## 2019-06-07 RX ADMIN — DILTIAZEM HYDROCHLORIDE 20 MG: 5 INJECTION INTRAVENOUS at 18:34

## 2019-06-07 RX ADMIN — ASPIRIN 81 MG 324 MG: 81 TABLET ORAL at 18:40

## 2019-06-07 ASSESSMENT — ENCOUNTER SYMPTOMS
LIGHT-HEADEDNESS: 1
FEVER: 0
SLEEP DISTURBANCE: 0
NAUSEA: 1
CHILLS: 0
DIAPHORESIS: 1
APPETITE CHANGE: 1
VOMITING: 1

## 2019-06-07 ASSESSMENT — MIFFLIN-ST. JEOR: SCORE: 1934.41

## 2019-06-07 NOTE — ED NOTES
Pt reports he is an alcoholic and last drink was Tuesday. Pt reports n/v that began Tuesday night after last drink.

## 2019-06-07 NOTE — ED TRIAGE NOTES
Mid sternal chest pain and SOB over last 5 days. Recent travel to [place]. Hx of A-fib. Pt in A-fib RVR on arrival to ED. Pt does not take blood thinners. Denies hx of blood clots.

## 2019-06-07 NOTE — ED PROVIDER NOTES
History     Chief Complaint:    Chest Pain and Shortness of Breath      HPI   Nigel Diaz is a 54 year old male with a history of atrial fibrillation, who presents to the ED for an evaluation for chest pain and shortness of breath. The patient states that he has been having symptoms of intermittent chest pain/pressure, shortness of breath, diaphoresis, and decreased appetite for the past four days. Three days ago, he notes that he felt nausea and vomited after drinking alcohol. Two days ago he had two additional episodes of emesis. For the past two days, he states that he has been having somewhat persistent chest pressure that concerned him for atrial fibrillation. Tonight, he was also feeling light headednessHe returned home from a trip to [place] today and promptly presented to the ED for evaluation of his symptoms. He otherwise denies any fever, chills, or abdominal pain      Allergies:  The patient has no known drug allergies.     Medications:    Duloxetine   Gabapentin  Ibuprofen  Tramadol     Past Medical History:    Atrial fibrillation   Gastroesophageal refux    Past Surgical History:    Cystectomy branchial cleft  Removal of sperm ducts    Family History:    Mother: thyroid disease, heart disease  Father: heart disease     Social History:  Negative for tobacco use.  Occasional alcohol use.   Marital Status:       Review of Systems   Constitutional: Positive for appetite change and diaphoresis. Negative for chills and fever.   Cardiovascular: Positive for chest pain.   Gastrointestinal: Positive for nausea and vomiting.   Neurological: Positive for light-headedness.   Psychiatric/Behavioral: Negative for sleep disturbance.   All other systems reviewed and are negative.      Physical Exam   First Vitals:         Physical Exam    Gen: Pleasant, appears stated age.    Eye:   Pupils are equal, round, and reactive.     Sclera non-injected.    ENT:   Moist mucus membranes.     Normal tongue.     Oropharynx without lesions.    Cardiac:     Tachycardic rate and irregular rhythm.    No murmurs, gallops, or rubs.    Pulmonary:     Clear to auscultation bilaterally.    No wheezes, rales, or rhonchi.    Abdomen:     Normal active bowel sounds.     Abdomen is soft and non-distended, without focal tenderness.    Musculoskeletal:     Normal movement of all extremities without evidence for deficit.    Extremities:    No edema.  Calves non-tender.    Skin:   Warm and dry.    Neurologic:    Non-focal exam without asymmetric weakness or numbness.    Normal tone    Psychiatric:     Normal affect with appropriate interaction with examiner.      Emergency Department Course   ECG:  Indication: chest pain  Time: 1810  Vent. Rate 174 bpm. WV interval *. QRS duration 86. QT/QTc 282/479. P-R-T axis * 80 14.  Atrial fibrilaltion with RVR. . Read time: 2108    Laboratory:  CBC: WBC: 8.4, HGB: 16.6, PLT: 223  BMP: Glucose 95, potassium 3.1 (L), chloride 93 (L), o/w WNL (Creatinine: 1.05)  1835 Troponin <0.015  D dimer: 0.4    Interventions:    1827 NS 1,000 mL IV  1834 Cardizem 20 mg IV  1840  Aspirin 324 mg PO  1954  Cardizem 90 mg PO  1955 NS 1,000 mL IV  2108 Potassium chloride 40 mEq   2140 Cardizem 5 mg/hr      Emergency Department Course:  Nursing notes and vitals reviewed. (1817) I performed an exam of the patient as documented above.     IV inserted. Medicine administered as documented above. Blood drawn. This was sent to the lab for further testing, results above.     EKG obtained in the ED, see results above.     1850 I rechecked the patient and discussed the results of his workup thus far.  He continues to be in atrial fibrillation with RVR.  No signs of alcohol withdrawal.     I consulted with Dr. Wyman of the hospitalist services. They are in agreement to accept the patient for admission.    Findings and plan explained to the Patient who consents to admission. Discussed the patient with Dr. Wyman, who will admit the  patient to a [facility] bed for further monitoring, evaluation, and treatment.    Impression & Plan    Medical Decision Making:  This is a 54yoM with history of paroxysmal atrial relation alcohol abuse who presents with chest pressure, diaphoresis and palpitations.  Patient symptoms have been ongoing for more than 48 hours which excludes him is a candidate for cardioversion in the ED.  Despite conservative measures, he remains in A. fib with RVR. The cause of this is probably multifactorial, related to heavy alcohol use, dehydration, and poor oral intake the last several days.  He does not currently have clinical signs of alcohol withdrawal.  Potassium repletion was started.  Patient's chads-vasc score is 0.  Chest pressure resolved following rate control.  Given that he remains in RVR, will be admitted to the hospital on a diltiazem drip rate control and cardiology evaluation.      Diagnosis:    ICD-10-CM    1. Atrial fibrillation with RVR (H) I48.91    2. Alcohol abuse F10.10        Disposition:  Admitted to Dr. Wyman    Scrib[place]etta Disclosure:  I, Lani WILLIAMSON, am serving as a scribe on 6/7/2019 at 6:17 PM to personally document services performed by Drea Carlos MD found based on my observations and the provider's statements to me.     Scribe Disclosure:  I,  Xander Perez, am serving as a scribe on 6/7/2019 at 9:12 PM to personally document services performed by Drea Carlos MD based on my observations and the provider's statements to me.         Lani WILLIAMSON  6/7/2019    EMERGENCY DEPARTMENT       Drea Carlos MD  06/08/19 0039       Drea Carlos MD  06/08/19 0040

## 2019-06-08 ENCOUNTER — APPOINTMENT (OUTPATIENT)
Dept: CARDIOLOGY | Facility: CLINIC | Age: 54
DRG: 310 | End: 2019-06-08
Attending: INTERNAL MEDICINE
Payer: COMMERCIAL

## 2019-06-08 PROBLEM — I48.91 A-FIB (H): Status: ACTIVE | Noted: 2019-06-08

## 2019-06-08 PROBLEM — I48.91 ATRIAL FIBRILLATION WITH RVR (H): Status: ACTIVE | Noted: 2019-06-08

## 2019-06-08 LAB
BASOPHILS # BLD AUTO: 0 10E9/L (ref 0–0.2)
BASOPHILS NFR BLD AUTO: 0.6 %
DIFFERENTIAL METHOD BLD: NORMAL
EOSINOPHIL # BLD AUTO: 0.3 10E9/L (ref 0–0.7)
EOSINOPHIL NFR BLD AUTO: 3.9 %
ERYTHROCYTE [DISTWIDTH] IN BLOOD BY AUTOMATED COUNT: 12.5 % (ref 10–15)
HCT VFR BLD AUTO: 41.1 % (ref 40–53)
HGB BLD-MCNC: 14.1 G/DL (ref 13.3–17.7)
IMM GRANULOCYTES # BLD: 0 10E9/L (ref 0–0.4)
IMM GRANULOCYTES NFR BLD: 0.2 %
LYMPHOCYTES # BLD AUTO: 3.4 10E9/L (ref 0.8–5.3)
LYMPHOCYTES NFR BLD AUTO: 53.5 %
MAGNESIUM SERPL-MCNC: 1.8 MG/DL (ref 1.6–2.3)
MCH RBC QN AUTO: 31.7 PG (ref 26.5–33)
MCHC RBC AUTO-ENTMCNC: 34.3 G/DL (ref 31.5–36.5)
MCV RBC AUTO: 92 FL (ref 78–100)
MONOCYTES # BLD AUTO: 0.7 10E9/L (ref 0–1.3)
MONOCYTES NFR BLD AUTO: 11.1 %
NEUTROPHILS # BLD AUTO: 2 10E9/L (ref 1.6–8.3)
NEUTROPHILS NFR BLD AUTO: 30.7 %
NRBC # BLD AUTO: 0 10*3/UL
NRBC BLD AUTO-RTO: 0 /100
PLATELET # BLD AUTO: 150 10E9/L (ref 150–450)
RBC # BLD AUTO: 4.45 10E12/L (ref 4.4–5.9)
WBC # BLD AUTO: 6.4 10E9/L (ref 4–11)

## 2019-06-08 PROCEDURE — 25000128 H RX IP 250 OP 636

## 2019-06-08 PROCEDURE — 25000132 ZZH RX MED GY IP 250 OP 250 PS 637: Performed by: INTERNAL MEDICINE

## 2019-06-08 PROCEDURE — G0378 HOSPITAL OBSERVATION PER HR: HCPCS

## 2019-06-08 PROCEDURE — 99232 SBSQ HOSP IP/OBS MODERATE 35: CPT | Performed by: INTERNAL MEDICINE

## 2019-06-08 PROCEDURE — 25000132 ZZH RX MED GY IP 250 OP 250 PS 637: Performed by: HOSPITALIST

## 2019-06-08 PROCEDURE — 25500064 ZZH RX 255 OP 636: Performed by: INTERNAL MEDICINE

## 2019-06-08 PROCEDURE — 25800030 ZZH RX IP 258 OP 636: Performed by: EMERGENCY MEDICINE

## 2019-06-08 PROCEDURE — 40000264 ECHOCARDIOGRAM COMPLETE

## 2019-06-08 PROCEDURE — 25800030 ZZH RX IP 258 OP 636: Performed by: INTERNAL MEDICINE

## 2019-06-08 PROCEDURE — 96366 THER/PROPH/DIAG IV INF ADDON: CPT

## 2019-06-08 PROCEDURE — 36415 COLL VENOUS BLD VENIPUNCTURE: CPT | Performed by: INTERNAL MEDICINE

## 2019-06-08 PROCEDURE — 21000001 ZZH R&B HEART CARE

## 2019-06-08 PROCEDURE — 99222 1ST HOSP IP/OBS MODERATE 55: CPT | Mod: 25 | Performed by: INTERNAL MEDICINE

## 2019-06-08 PROCEDURE — 85025 COMPLETE CBC W/AUTO DIFF WBC: CPT | Performed by: INTERNAL MEDICINE

## 2019-06-08 PROCEDURE — 93306 TTE W/DOPPLER COMPLETE: CPT | Mod: 26 | Performed by: INTERNAL MEDICINE

## 2019-06-08 PROCEDURE — 25000125 ZZHC RX 250: Performed by: EMERGENCY MEDICINE

## 2019-06-08 RX ORDER — ASPIRIN 81 MG/1
81 TABLET ORAL DAILY
Status: DISCONTINUED | OUTPATIENT
Start: 2019-06-09 | End: 2019-06-10

## 2019-06-08 RX ORDER — ACETAMINOPHEN 325 MG/1
650 TABLET ORAL EVERY 4 HOURS PRN
Status: DISCONTINUED | OUTPATIENT
Start: 2019-06-08 | End: 2019-06-10 | Stop reason: HOSPADM

## 2019-06-08 RX ORDER — PROCHLORPERAZINE 25 MG
25 SUPPOSITORY, RECTAL RECTAL EVERY 12 HOURS PRN
Status: DISCONTINUED | OUTPATIENT
Start: 2019-06-08 | End: 2019-06-10 | Stop reason: HOSPADM

## 2019-06-08 RX ORDER — ONDANSETRON 2 MG/ML
4 INJECTION INTRAMUSCULAR; INTRAVENOUS EVERY 6 HOURS PRN
Status: DISCONTINUED | OUTPATIENT
Start: 2019-06-08 | End: 2019-06-10 | Stop reason: HOSPADM

## 2019-06-08 RX ORDER — ACETAMINOPHEN 650 MG/1
650 SUPPOSITORY RECTAL EVERY 4 HOURS PRN
Status: DISCONTINUED | OUTPATIENT
Start: 2019-06-08 | End: 2019-06-10 | Stop reason: HOSPADM

## 2019-06-08 RX ORDER — LIDOCAINE 40 MG/G
CREAM TOPICAL
Status: DISCONTINUED | OUTPATIENT
Start: 2019-06-08 | End: 2019-06-10 | Stop reason: HOSPADM

## 2019-06-08 RX ORDER — ONDANSETRON 4 MG/1
4 TABLET, ORALLY DISINTEGRATING ORAL EVERY 6 HOURS PRN
Status: DISCONTINUED | OUTPATIENT
Start: 2019-06-08 | End: 2019-06-10 | Stop reason: HOSPADM

## 2019-06-08 RX ORDER — TRAMADOL HYDROCHLORIDE 50 MG/1
100 TABLET ORAL 2 TIMES DAILY
Status: DISCONTINUED | OUTPATIENT
Start: 2019-06-08 | End: 2019-06-10 | Stop reason: HOSPADM

## 2019-06-08 RX ORDER — POTASSIUM CHLORIDE 7.45 MG/ML
10 INJECTION INTRAVENOUS
Status: DISCONTINUED | OUTPATIENT
Start: 2019-06-08 | End: 2019-06-10 | Stop reason: HOSPADM

## 2019-06-08 RX ORDER — POTASSIUM CHLORIDE 29.8 MG/ML
20 INJECTION INTRAVENOUS
Status: DISCONTINUED | OUTPATIENT
Start: 2019-06-08 | End: 2019-06-10 | Stop reason: HOSPADM

## 2019-06-08 RX ORDER — NITROGLYCERIN 0.4 MG/1
0.4 TABLET SUBLINGUAL EVERY 5 MIN PRN
Status: DISCONTINUED | OUTPATIENT
Start: 2019-06-08 | End: 2019-06-10 | Stop reason: HOSPADM

## 2019-06-08 RX ORDER — POLYETHYLENE GLYCOL 3350 17 G/17G
17 POWDER, FOR SOLUTION ORAL DAILY PRN
Status: DISCONTINUED | OUTPATIENT
Start: 2019-06-08 | End: 2019-06-10 | Stop reason: HOSPADM

## 2019-06-08 RX ORDER — POTASSIUM CHLORIDE 1.5 G/1.58G
20-40 POWDER, FOR SOLUTION ORAL
Status: DISCONTINUED | OUTPATIENT
Start: 2019-06-08 | End: 2019-06-10 | Stop reason: HOSPADM

## 2019-06-08 RX ORDER — AMOXICILLIN 250 MG
2 CAPSULE ORAL 2 TIMES DAILY PRN
Status: DISCONTINUED | OUTPATIENT
Start: 2019-06-08 | End: 2019-06-10 | Stop reason: HOSPADM

## 2019-06-08 RX ORDER — POTASSIUM CHLORIDE 1500 MG/1
20-40 TABLET, EXTENDED RELEASE ORAL
Status: DISCONTINUED | OUTPATIENT
Start: 2019-06-08 | End: 2019-06-10 | Stop reason: HOSPADM

## 2019-06-08 RX ORDER — POTASSIUM CL/LIDO/0.9 % NACL 10MEQ/0.1L
10 INTRAVENOUS SOLUTION, PIGGYBACK (ML) INTRAVENOUS
Status: DISCONTINUED | OUTPATIENT
Start: 2019-06-08 | End: 2019-06-10 | Stop reason: HOSPADM

## 2019-06-08 RX ORDER — NALOXONE HYDROCHLORIDE 0.4 MG/ML
.1-.4 INJECTION, SOLUTION INTRAMUSCULAR; INTRAVENOUS; SUBCUTANEOUS
Status: DISCONTINUED | OUTPATIENT
Start: 2019-06-08 | End: 2019-06-10 | Stop reason: HOSPADM

## 2019-06-08 RX ORDER — MAGNESIUM SULFATE HEPTAHYDRATE 40 MG/ML
4 INJECTION, SOLUTION INTRAVENOUS EVERY 4 HOURS PRN
Status: DISCONTINUED | OUTPATIENT
Start: 2019-06-08 | End: 2019-06-10 | Stop reason: HOSPADM

## 2019-06-08 RX ORDER — SODIUM CHLORIDE 9 MG/ML
INJECTION, SOLUTION INTRAVENOUS CONTINUOUS
Status: DISCONTINUED | OUTPATIENT
Start: 2019-06-08 | End: 2019-06-10 | Stop reason: HOSPADM

## 2019-06-08 RX ORDER — GABAPENTIN 100 MG/1
100 CAPSULE ORAL 3 TIMES DAILY
Status: DISCONTINUED | OUTPATIENT
Start: 2019-06-08 | End: 2019-06-10 | Stop reason: HOSPADM

## 2019-06-08 RX ORDER — DULOXETIN HYDROCHLORIDE 30 MG/1
30 CAPSULE, DELAYED RELEASE ORAL 2 TIMES DAILY
Status: DISCONTINUED | OUTPATIENT
Start: 2019-06-08 | End: 2019-06-10 | Stop reason: HOSPADM

## 2019-06-08 RX ORDER — PROCHLORPERAZINE MALEATE 5 MG
10 TABLET ORAL EVERY 6 HOURS PRN
Status: DISCONTINUED | OUTPATIENT
Start: 2019-06-08 | End: 2019-06-10 | Stop reason: HOSPADM

## 2019-06-08 RX ORDER — AMOXICILLIN 250 MG
1 CAPSULE ORAL 2 TIMES DAILY PRN
Status: DISCONTINUED | OUTPATIENT
Start: 2019-06-08 | End: 2019-06-10 | Stop reason: HOSPADM

## 2019-06-08 RX ADMIN — TRAMADOL HYDROCHLORIDE 100 MG: 50 TABLET, COATED ORAL at 21:50

## 2019-06-08 RX ADMIN — Medication 5600 UNITS: at 18:46

## 2019-06-08 RX ADMIN — HUMAN ALBUMIN MICROSPHERES AND PERFLUTREN 9 ML: 10; .22 INJECTION, SOLUTION INTRAVENOUS at 11:09

## 2019-06-08 RX ADMIN — TRAMADOL HYDROCHLORIDE 100 MG: 50 TABLET, COATED ORAL at 06:24

## 2019-06-08 RX ADMIN — GABAPENTIN 100 MG: 100 CAPSULE ORAL at 21:50

## 2019-06-08 RX ADMIN — DULOXETINE HYDROCHLORIDE 30 MG: 30 CAPSULE, DELAYED RELEASE ORAL at 21:50

## 2019-06-08 RX ADMIN — DULOXETINE HYDROCHLORIDE 30 MG: 30 CAPSULE, DELAYED RELEASE ORAL at 06:24

## 2019-06-08 RX ADMIN — HEPARIN SODIUM 1150 UNITS/HR: 10000 INJECTION, SOLUTION INTRAVENOUS at 18:46

## 2019-06-08 RX ADMIN — GABAPENTIN 100 MG: 100 CAPSULE ORAL at 06:24

## 2019-06-08 RX ADMIN — POTASSIUM CHLORIDE 40 MEQ: 1500 TABLET, EXTENDED RELEASE ORAL at 10:04

## 2019-06-08 RX ADMIN — GABAPENTIN 100 MG: 100 CAPSULE ORAL at 14:28

## 2019-06-08 RX ADMIN — SODIUM CHLORIDE: 9 INJECTION, SOLUTION INTRAVENOUS at 01:23

## 2019-06-08 RX ADMIN — SODIUM CHLORIDE: 9 INJECTION, SOLUTION INTRAVENOUS at 21:56

## 2019-06-08 ASSESSMENT — ACTIVITIES OF DAILY LIVING (ADL)
ADLS_ACUITY_SCORE: 11
RETIRED_EATING: 0-->INDEPENDENT
WHICH_OF_THE_ABOVE_FUNCTIONAL_RISKS_HAD_A_RECENT_ONSET_OR_CHANGE?: FALL HISTORY
BATHING: 0-->INDEPENDENT
RETIRED_COMMUNICATION: 0-->UNDERSTANDS/COMMUNICATES WITHOUT DIFFICULTY
DRESS: 0-->INDEPENDENT
ADLS_ACUITY_SCORE: 11
FALL_HISTORY_WITHIN_LAST_SIX_MONTHS: YES
SWALLOWING: 0-->SWALLOWS FOODS/LIQUIDS WITHOUT DIFFICULTY
NUMBER_OF_TIMES_PATIENT_HAS_FALLEN_WITHIN_LAST_SIX_MONTHS: 1
TOILETING: 0-->INDEPENDENT
COGNITION: 0 - NO COGNITION ISSUES REPORTED
ADLS_ACUITY_SCORE: 11
TRANSFERRING: 0-->INDEPENDENT
AMBULATION: 0-->INDEPENDENT

## 2019-06-08 ASSESSMENT — MIFFLIN-ST. JEOR: SCORE: 1953.92

## 2019-06-08 NOTE — PLAN OF CARE
Neuro- A&Ox4  Vitals- stable except soft BP's  Tele- A fib RVR/CVR, rates bump up to 150-170's with activity  Resp- stable on RA  Activity- Indepenent  Pain- denies  Drips- NS @ 75ml/hr, was on Dilt @ 5mg/hr-stopped at 0233  Drains/Tubes- none  Skin- no concerns  Plan- Cards consult, ECHO  Misc- NA

## 2019-06-08 NOTE — H&P
"Admitted:     06/07/2019      CHIEF COMPLAINT:  Chest pain, shortness of breath.      HISTORY OF PRESENT ILLNESS:  Mr. Nigel Diaz is a 54-year-old gentleman with history noted below, including psoriasis with psoriatic arthritis, paroxysmal atrial fibrillation and history of alcoholism, who presents with the above complaints.  The patient was recently traveling in [place], [place].  About 4 days ago, he has noticed intermittent chest pain/pressure and shortness of breath with diaphoresis.  This was also associated with decreased appetite.  About 3 days ago, he noted some nausea and vomiting after drinking alcohol.  For the past 2 days, he has had persistent chest pressure, and he had some lightheadedness tonight.  He returned home from [place] today and promptly, from the airport, came to [facility] ER for further evaluation.      The patient was seen in the ER and had vitals that showed temperature 98.4.  Heart rate was in the 160s-180s.  Blood pressure was 145/86, respiration rate 10, O2 saturation 100% on room air.  EKG showed atrial fibrillation with RVR.  Laboratory evaluation showed that his troponin was negative.  He had D-dimer 0.4.  He was given IV diltiazem and also a 90 mg tablet of diltiazem, with improvement in his heart rate, but he remains in atrial fibrillation, and he has been started on diltiazem drip.  Overall, given his issues, request for admission was made.      The patient does not have any complaints of fevers or chills.  He admits to being an alcoholic.  He says that he has \"fell off\" over the past 6 months.  He last drank more than 72 hours ago.      PAST MEDICAL HISTORY:   1.  Psoriasis with psoriatic arthritis.  He is on Humira since 01/2019.  He follows with his rheumatologist named Dr. Champagne in [facility].   2.  History of atrial fibrillation requiring cardioversion about 3-5 years ago.   3.  Alcohol use disorder.  The patient has history of alcoholism and has had outpatient " chemical dependency treatment.  He has had driving-related alcohol issues.   4.  Cervical spine/disc disease.  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  Has had epidural steroid injection and has been undergoing physical therapy.     PAST SURGICAL HISTORY:   1.  Status post vasectomy.   2.  Status post branchial cleft cystectomy 5 years ago.      ALLERGIES:  NO KNOWN DRUG ALLERGIES.      HOME MEDICATIONS:   1.  Adalimumab 40 mg subcutaneously for 14 days.   2.  Duloxetine 30 mg b.i.d.   3.  Gabapentin 100 mg 3 times a day.     4.  Multivitamin.   5.  Tramadol 100 mg b.i.d.      SOCIAL HISTORY:  This patient does not smoke.  The patient does admit to an alcohol problem.  He generally does not drink and tries to stay sober, but he has had 3 episodes of some drinking binges over the past 6 months.  he is .  He has 3 children.  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      FAMILY HISTORY:  reviewed and found to be noncontributory.        REVIEW OF SYSTEMS:  As noted in the HPI, otherwise 10-point review of systems negative.      PHYSICAL EXAMINATION:   VITAL SIGNS:  Temperature 98.4 degrees, heart rate 105, blood pressure 129/94, respiration rate 16, O2 saturation 98% on room air.   GENERAL:  Alert and oriented 54-year-old male patient lying in bed.  Conversant and friendly.   HEENT:  Pupils equal, round, reactive.   No scleral icterus or conjunctival injection.  Oropharynx reveals no gross erythema or exudate.     NECK:  No bruits, JVD or adenopathy.   HEART:  Irregular and tachycardic without significant murmurs, rubs, gallops.   LUNGS:  Grossly clear, no crackles or wheezing.   ABDOMEN:  Soft, nontender with positive bowel sounds, no femoral bruits.   EXTREMITIES:  No edema.      LABORATORY DATA:  Sodium 134, potassium 3.1, chloride 93, bicarbonate 31, BUN 22, creatinine 1.05, calcium 9.7, troponin less than 0.015.  CBC is normal.  D-dimer 0.4.      EKG, which I personally reviewed,  showed atrial fibrillation with RVR without acute ischemic changes.      ASSESSMENT AND PLAN:  Mr. Nigel Diaz is a 54-year-old male patient with history including psoriatic arthritis, paroxysmal atrial fibrillation and history of alcoholism, who presents with chest pain, shortness of breath and found to be in atrial fibrillation with rapid ventricular response.      1.  Atrial fibrillation with rapid ventricular response.  He has history of cardioversion about 3-5 years ago, and as far as he knows, he has not been in atrial fibrillation since that time.  His CHADS2-VASc score is 0.  At this time, we will continue diltiazem drip.  He was given a dose of oral diltiazem as well.  We will see if he converts overnight, but if not, then we will start some oral diltiazem tomorrow morning.  We will continue daily aspirin.  Order echocardiogram.  We will ask Cardiology to see the patient.    2.  Chest pain, shortness of breath, suspect due to above.  Initial EKG did not show any acute ischemic changes.  Troponin is negative.  Treat atrial fibrillation as above.    3.  Hypokalemia.  Replace potassium.  Also will check magnesium and replace if low.    4.  Alcohol use disorder.  The patient admits to being an alcoholic, and he tries to remain sober, generally.  He has had episodes of relapse about 3 times over the past 6 months.  He expresses remorse for drinking and expresses good insight and judgment about his drinking.  I continued to encourage him to continue with outpatient therapies.  I feel he is at low risk for developing alcohol withdrawal at this time.    5.  Cervical spine/disc disease.  Continue [id number] duloxetine and gabapentin.     6.  Prophylaxis.  Pneumoboots and ambulation.     7.  Code status:  Full code.     8.  Disposition:  If the patient did converts to sinus, and after other cardiac evaluation, he may be able to be discharged tomorrow.         JOJO VARGAS JR., MD             D: 06/07/2019    T: 2019   MT: MALISSA      Name:     DILCIA HYDE   MRN:      [id number]        Account:      [id number]   :      1965        Admitted:     2019                   Document: [id number]       cc: Nestor DARNELL

## 2019-06-08 NOTE — PROGRESS NOTES
RECEIVING UNIT ED HANDOFF REVIEW    ED Nurse Handoff Report was reviewed by: Ashley Mittal on June 7, 2019 at 10:40 PM

## 2019-06-08 NOTE — CONSULTS
Consult Date:  06/08/2019      REASON FOR CONSULTATION:  Atrial fibrillation with rapid ventricular rates.      HISTORY OF PRESENT ILLNESS:  Mr. Diaz is a 54-year-old male with a background history of alcoholism, psoriatic arthritis (on Humira), chronic neuropathic pains and a history of paroxysmal atrial fibrillation.  He comes in today with a several-day history of exertional intolerance, diaphoresis, nausea with emesis, and chest pressure.  Symptoms are aggravated with simple exertional activities.  He noticed this while on vacation in [place], [place].  Since his arrival back to [place], [place], he sought evaluation at [facility].  Workup was notable for atrial fibrillation with rapid ventricular rates.  He was initiated on IV diltiazem and admitted to the Hospitalist Service.  It is in this context that the Cardiology Consult Service was asked to evaluate.      In speaking with Mr. Diaz, he endorses the history described above.  Prior to his onset of symptoms, he does endorse relapses with alcohol use.  In addition, he endorses compliance with his medications and up until this past week was in his usual state of health.      VITAL SIGNS:  Reveal a heart rate in the low 120s, blood pressure is 126/90.  He is saturating well on room air.      CBC is without abnormality.  Potassium is low at 3.1, creatinine is 1.05.  D-dimer is negative.  Troponin is negative.  Chest x-ray is normal.      A transthoracic echocardiogram was a difficult study to interpret given the irregularity and rapidity of his heart rhythm.  LVEF was estimated at 50% to 55%, and there was no significant valvular heart disease.  Right ventricle appeared with modestly reduced systolic function.  No other major abnormalities were identified.      SOCIAL HISTORY:  The patient does not smoke.  He occasionally drinks alcohol, but does maintain periods of abstinence.  He is .  He has 3 children.      FAMILY  HISTORY:  Reviewed and noncontributory.      REVIEW OF SYSTEMS:  Otherwise negative except as what is already noted in the HPI.      PHYSICAL EXAMINATION:   GENERAL:  Healthy-appearing, tall-statured man.  Sensorium clear, cognition intact, intelligent, wife present during the encounter, normal body weight.   HEENT:  No major abnormalities.  Vessels non-elevated JVP.   HEART:  Irregular, variable S1, normal S2.  No murmurs.   LUNGS:  Clear to auscultation bilaterally.   ABDOMEN:  Benign.   EXTREMITIES:  No edema.  Normal perfusion.   SKIN:  Dry, with no rashes.      IMPRESSION,  REPORT AND PLAN:   1.  Paroxysmal atrial fibrillation with rapid ventricular rates.  Mr. Diaz returns to [facility] following a recent development of atrial fibrillation with rapid ventricular response.  He has been quite symptomatic from this.  We have established a reasonable amount of rate control with the initiation of diltiazem.  As long as he is in atrial fibrillation, this will be continued.  Mr. Diaz may spontaneously convert at any moment.  Alternatively, his rhythm may persist and he may require electrical cardioversion.  For the time being, we will maintain him with a rate control strategy, but we will tentatively plan to proceed with a KRISTEN cardioversion tomorrow.  For this, he will need to be maintained in a fasting state.  If a cardioversion is pursued, he would need to be initiated on anticoagulation.  Following his restoration of sinus rhythm, we advised that he be followed as an outpatient to see our heart rhythm colleagues to discuss long-term strategies.  Given his low stroke profile risk, he would only need a short duration of anticoagulation, that being approximately 4 weeks status post cardioversion.      He was encouraged to avoid alcohol use.  He was also encouraged to maintain an active lifestyle with exercise to avoid atrial fibrillation relapse.  I do think it would be of value to check his TSH  to ensure that he is without subclinical thyroid disease.  I also think it would be worth having a repeated sleep evaluation to see if he warrants nocturnal CPAP therapy as a potential modifiable risk factor for recurrent atrial fibrillation.      Thank you for this consultation.  We will continue to follow along.  If there are any ongoing questions or concerns, feel free to contact the Cardiology Consult Service.         TAWNY DIMAS MD             D: 2019   T: 2019   MT: MALISSA      Name:     DILCIA HYDE   MRN:      [id number]        Account:       [id number]   :      1965           Consult Date:  2019      Document: [id number]

## 2019-06-08 NOTE — PLAN OF CARE
Alert and oriented x 4 and no complaints of pain or chest pressure. BP stable, tele A fib with CVR/RVR rates in to 90's-150's. Diltiazem drip restarted this morning as blood pressure would tolerate it. Echo completed today with plans for KRISTEN and cardioversion on Monday.

## 2019-06-08 NOTE — PROGRESS NOTES
[facility]    HOSPITALIST PROGRESS NOTE :   --------------------------------------------------    Date of Admission:  6/7/2019    Cumulative Summary: Nigel Diaz is a 54 year old male with past medical history significant for psoriatic arthritis, paroxysmal atrial fibrillation and history of alcoholism who presented with chest pain, shortness of breath and was found to be in atrial fibrillation with RVR.  Patient was admitted for further evaluation and management.    Assessment & Plan     Active Problems:  Atrial fibrillation with RVR (H) (6/8/2019): Unclear etiology, not sure if underlying alcohol use disorder might be playing a role. He was also slightly hypokalemic on admission which might be secondary to alcohol use.  He does have history of cardioversion about 3 to 5 years ago and as far as he knows he has not been in atrial fibrillation since that time.  His CHADs2- VASc score is 0.  Patient was initially started on Cardizem infusion and was admitted as observation with the hope that he might be able to convert back to normal sinus rhythm.  Patient remains in atrial fibrillation with RVR this morning, at rest his heart rate can be in early 100s but they can go as high as 150s with exertion.  He is denying any chest pressure at this point especially when his heart rate is in 110,s.  His initial EKG on admission did not show any acute ischemic changes in his troponin were negative.  He was also noticed to have slight low blood pressure with systolic in the low 90s and required Cardizem infusion to be stopped but restarted this morning due to heart rate being in 120s and 130s.    -- Continue to monitor patient closely on telemetry.  -- We will change his status to inpatient considering patient is still in symptomatic atrial fibrillation with RVR.  -- Continue patient on normal saline at 75 mL/h.  -- Continue patient on Cardizem infusion for 5 to 15 mg/h to titrate heart rate less than 1  bpm.  -- Cardiology has been consulted, appreciate their help, will follow up on their recommendations.  -- Echocardiogram is done, irregular rhythm and tachycardia made accurate ejection fraction and wall motion assessment somewhat challenging for study, visual ejection fraction is estimated to be 50 to 55% with mild to moderate mitral regurgitation.  -- Start patient on aspirin 81 mg p.o. daily.  -- Will follow up on cardiology recommendation if they would prefer patient to be started on oral beta-blocker versus oral Cardizem, patient might need cardioversion if continues to be in atrial fibrillation.  -- Continue to monitor and replace electrolytes.  -- He did have long conversation with patient regarding importance of staying abstinent from alcohol as it can definitely contribute to A. fib with RVR.    Psoriasis with psoriatic arthritis: He is on Humira since January 2019 he follows up with his rheumatologist Dr. Doyle in [facility]  --Continue outpatient follow-up.    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  He has had epidural steroid injections in the past and has been undergoing physical therapy.  --Continue outpatient PT.    Alcohol use disorder: Patient does have history of alcoholism and has had outpatient chemical dependency treatment he has had driving related alcohol issues in the past also.  --As above long discussion with patient regarding importance of his staying abstinent from alcohol.  At this time his last alcohol consumption was to state, patient does not have any signs and symptoms of alcohol withdrawal at this point.    Diet: Regular Diet Adult    Cai Catheter: not present  DVT Prophylaxis: Pneumatic Compression Devices and Ambulate every shift  Code Status: Full Code    The patient's care was discussed with the Patient and Patient's Family.    Disposition Plan   Expected discharge: Tomorrow, recommended to prior living arrangement      Parris Rowland MD, FACP  Text Page (7am - 6pm)    ----------------------------------------------------------------------------------------------------------------------      Interval History   Patient care was assumed this morning, patient was seen and examined.  Patient is sitting in bed, eating lunch, wife also present in the room.  Patient is denying any chest pressure at this point although he feels that usually when he is trying to exert himself.  He is feeling some fluttering but not to the point when he arrived to the hospital.  We discussed about importance of staying abstinent from alcohol.  Patient last consumption of alcohol was on Tuesday and he is planning to stay sober.    -Data reviewed today: I reviewed all new labs and imaging results over the last 24 hours.    I personally reviewed no images or EKG's today.    Physical Exam   Temp: 98.1  F (36.7  C) Temp src: Oral BP: 107/68 Pulse: 111 Heart Rate: 96 Resp: 16 SpO2: 97 % O2 Device: None (Room air)    Vitals:    06/07/19 1820 06/08/19 0536   Weight: 96.6 kg (213 lb) 98.6 kg (217 lb 4.8 oz)     Vital Signs with Ranges  Temp:  [98.1  F (36.7  C)-98.4  F (36.9  C)] 98.1  F (36.7  C)  Pulse:  [] 111  Heart Rate:  [] 96  Resp:  [6-18] 16  BP: ()/(62-94) 107/68  SpO2:  [96 %-100 %] 97 %  No intake/output data recorded.    GENERAL: Alert , awake and oriented. NAD. Conversational, appropriate.   HEENT: Normocephalic. EOMI. No icterus or injection. Nares normal.   LUNGS: Clear to auscultation. No dyspnea at rest.   HEART: Irregular and tachycardic, Extremities perfused.   ABDOMEN: Soft, nontender, and nondistended. Positive bowel sounds.   EXTREMITIES: No LE edema noted.   NEUROLOGIC: Moves extremities x4 on command. No acute focal neurologic abnormalities noted.     Medications     diltiazem (CARDIZEM) infusion ADULT Stopped (06/08/19 0233)     - MEDICATION INSTRUCTIONS -       sodium chloride 75 mL/hr at 06/08/19 0123       [START ON  6/9/2019] aspirin  81 mg Oral Daily     DULoxetine  30 mg Oral BID     gabapentin  100 mg Oral TID     sodium chloride (PF)  3 mL Intracatheter Q8H     traMADol  100 mg Oral BID       Data   Recent Labs   Lab 06/08/19  0532 06/07/19  1835   WBC 6.4 8.4   HGB 14.1 16.6   MCV 92 91    223   NA  --  134   POTASSIUM  --  3.1*   CHLORIDE  --  93*   CO2  --  31   BUN  --  22   CR  --  1.05   ANIONGAP  --  10   ODALYS  --  9.7   GLC  --  95   TROPI  --  <0.015       Imaging:   No results found for this or any previous visit (from the past 24 hour(s)).

## 2019-06-08 NOTE — PROGRESS NOTES
RECEIVING UNIT ED HANDOFF REVIEW    ED Nurse Handoff Report was reviewed by: Jonathan Hahn on June 7, 2019 at 10:40 PM

## 2019-06-08 NOTE — ED NOTES
"[facility]  ED Nurse Handoff Report    ED Chief complaint: Chest Pain and Shortness of Breath      ED Diagnosis:   Final diagnoses:   Atrial fibrillation with RVR (H)   Alcohol abuse       Code Status: Full Code    Allergies:   Allergies   Allergen Reactions     No Known Drug Allergies        Activity level - Baseline/Home:  Independent  Activity Level - Current:   Independent    Patient's Preferred language: english   Needed?: No    Isolation: No  Infection: Not Applicable  Bariatric?: No    Vital Signs:   Vitals:    06/07/19 2035 06/07/19 2050 06/07/19 2055 06/07/19 2109   BP:       Pulse:       Resp: 16 15 16 16   Temp:       TempSrc:       SpO2: 100% 100% 100% 99%   Weight:       Height:           Cardiac Rhythm: ,   Cardiac  Cardiac Rhythm: Atrial fibrillation(RVR)    Pain level:      Is this patient confused?: No   Does this patient have a guardian?  No         If yes, is there guardianship documents in the Epic \"Code/ACP\" activity?  N/A         Guardian Notified?  N/A  Severance - Suicide Severity Rating Scale Completed?  Yes  If yes, what color did the patient score?  White    Patient Report: Initial Complaint: Chest pain  Focused Assessment: Pt arrived in A fib with RVR, hx of blood clots, does not take blood thinners daily  Tests Performed: lab, EKG  Abnormal Results:   Labs Ordered and Resulted from Time of ED Arrival Up to the Time of Departure from the ED   BASIC METABOLIC PANEL - Abnormal; Notable for the following components:       Result Value    Potassium 3.1 (*)     Chloride 93 (*)     All other components within normal limits   CBC WITH PLATELETS DIFFERENTIAL   TROPONIN I   D DIMER QUANTITATIVE   PERIPHERAL IV CATHETER       Treatments provided: IV medication    Family Comments: Wife at bedside    OBS brochure/video discussed/provided to patient/family: Yes              Name of person given brochure if not patient:               Relationship to patient:     ED " Medications:   Medications   0.9% sodium chloride BOLUS (0 mLs Intravenous Stopped 6/7/19 2129)     Followed by   sodium chloride 0.9% infusion (1,000 mLs Intravenous Not Given 6/7/19 2054)   diltiazem (CARDIZEM) 125 mg in sodium chloride 0.9 % 125 mL infusion (has no administration in time range)   0.9% sodium chloride BOLUS (0 mLs Intravenous Stopped 6/7/19 1935)   diltiazem (CARDIZEM) injection 20 mg (20 mg Intravenous Given 6/7/19 1834)   aspirin (ASA) chewable tablet 324 mg (324 mg Oral Given 6/7/19 1840)   diltiazem (CARDIZEM) tablet 90 mg (90 mg Oral Given 6/7/19 1954)   potassium chloride ER (K-DUR/KLOR-CON M) CR tablet 40 mEq (40 mEq Oral Given 6/7/19 2108)       Drips infusing?:  Yes    For the majority of the shift this patient was Green.   Interventions performed were .    Severe Sepsis OR Septic Shock Diagnosis Present: No    To be done/followed up on inpatient unit:      ED NURSE PHONE NUMBER: [phone]     '

## 2019-06-09 LAB
ANION GAP SERPL CALCULATED.3IONS-SCNC: 4 MMOL/L (ref 3–14)
ANION GAP SERPL CALCULATED.3IONS-SCNC: 5 MMOL/L (ref 3–14)
BUN SERPL-MCNC: 10 MG/DL (ref 7–30)
BUN SERPL-MCNC: 13 MG/DL (ref 7–30)
CALCIUM SERPL-MCNC: 8 MG/DL (ref 8.5–10.1)
CALCIUM SERPL-MCNC: 8.7 MG/DL (ref 8.5–10.1)
CHLORIDE SERPL-SCNC: 106 MMOL/L (ref 94–109)
CHLORIDE SERPL-SCNC: 108 MMOL/L (ref 94–109)
CO2 SERPL-SCNC: 29 MMOL/L (ref 20–32)
CO2 SERPL-SCNC: 33 MMOL/L (ref 20–32)
CREAT SERPL-MCNC: 0.68 MG/DL (ref 0.66–1.25)
CREAT SERPL-MCNC: 0.8 MG/DL (ref 0.66–1.25)
GFR SERPL CREATININE-BSD FRML MDRD: >90 ML/MIN/{1.73_M2}
GFR SERPL CREATININE-BSD FRML MDRD: >90 ML/MIN/{1.73_M2}
GLUCOSE SERPL-MCNC: 103 MG/DL (ref 70–99)
GLUCOSE SERPL-MCNC: 121 MG/DL (ref 70–99)
LMWH PPP CHRO-ACNC: 0.22 IU/ML
LMWH PPP CHRO-ACNC: 0.37 IU/ML
LMWH PPP CHRO-ACNC: NORMAL IU/ML
LMWH PPP CHRO-ACNC: NORMAL IU/ML
MAGNESIUM SERPL-MCNC: 1.6 MG/DL (ref 1.6–2.3)
POTASSIUM SERPL-SCNC: 3.7 MMOL/L (ref 3.4–5.3)
POTASSIUM SERPL-SCNC: 4 MMOL/L (ref 3.4–5.3)
SODIUM SERPL-SCNC: 142 MMOL/L (ref 133–144)
SODIUM SERPL-SCNC: 143 MMOL/L (ref 133–144)
T4 FREE SERPL-MCNC: 0.98 NG/DL (ref 0.76–1.46)
TSH SERPL DL<=0.005 MIU/L-ACNC: 0.31 MU/L (ref 0.4–4)

## 2019-06-09 PROCEDURE — 83735 ASSAY OF MAGNESIUM: CPT | Performed by: INTERNAL MEDICINE

## 2019-06-09 PROCEDURE — 93010 ELECTROCARDIOGRAM REPORT: CPT | Performed by: INTERNAL MEDICINE

## 2019-06-09 PROCEDURE — 85520 HEPARIN ASSAY: CPT | Performed by: INTERNAL MEDICINE

## 2019-06-09 PROCEDURE — 25800030 ZZH RX IP 258 OP 636: Performed by: EMERGENCY MEDICINE

## 2019-06-09 PROCEDURE — 84439 ASSAY OF FREE THYROXINE: CPT | Performed by: INTERNAL MEDICINE

## 2019-06-09 PROCEDURE — 21000001 ZZH R&B HEART CARE

## 2019-06-09 PROCEDURE — 80048 BASIC METABOLIC PNL TOTAL CA: CPT | Performed by: INTERNAL MEDICINE

## 2019-06-09 PROCEDURE — 36415 COLL VENOUS BLD VENIPUNCTURE: CPT | Performed by: INTERNAL MEDICINE

## 2019-06-09 PROCEDURE — 84443 ASSAY THYROID STIM HORMONE: CPT | Performed by: INTERNAL MEDICINE

## 2019-06-09 PROCEDURE — 25000132 ZZH RX MED GY IP 250 OP 250 PS 637: Performed by: INTERNAL MEDICINE

## 2019-06-09 PROCEDURE — 25000132 ZZH RX MED GY IP 250 OP 250 PS 637: Performed by: HOSPITALIST

## 2019-06-09 PROCEDURE — 25000125 ZZHC RX 250: Performed by: EMERGENCY MEDICINE

## 2019-06-09 PROCEDURE — 99231 SBSQ HOSP IP/OBS SF/LOW 25: CPT | Performed by: INTERNAL MEDICINE

## 2019-06-09 PROCEDURE — 25000128 H RX IP 250 OP 636: Performed by: INTERNAL MEDICINE

## 2019-06-09 PROCEDURE — 99232 SBSQ HOSP IP/OBS MODERATE 35: CPT | Performed by: INTERNAL MEDICINE

## 2019-06-09 PROCEDURE — 93005 ELECTROCARDIOGRAM TRACING: CPT

## 2019-06-09 RX ADMIN — HEPARIN SODIUM 1100 UNITS/HR: 10000 INJECTION, SOLUTION INTRAVENOUS at 11:39

## 2019-06-09 RX ADMIN — DULOXETINE HYDROCHLORIDE 30 MG: 30 CAPSULE, DELAYED RELEASE ORAL at 17:39

## 2019-06-09 RX ADMIN — GABAPENTIN 100 MG: 100 CAPSULE ORAL at 17:41

## 2019-06-09 RX ADMIN — DILTIAZEM HYDROCHLORIDE 5 MG/HR: 5 INJECTION INTRAVENOUS at 07:46

## 2019-06-09 RX ADMIN — ASPIRIN 81 MG: 81 TABLET, COATED ORAL at 11:39

## 2019-06-09 RX ADMIN — TRAMADOL HYDROCHLORIDE 100 MG: 50 TABLET, COATED ORAL at 06:00

## 2019-06-09 RX ADMIN — DULOXETINE HYDROCHLORIDE 30 MG: 30 CAPSULE, DELAYED RELEASE ORAL at 06:00

## 2019-06-09 RX ADMIN — GABAPENTIN 100 MG: 100 CAPSULE ORAL at 06:00

## 2019-06-09 RX ADMIN — TRAMADOL HYDROCHLORIDE 100 MG: 50 TABLET, COATED ORAL at 17:39

## 2019-06-09 RX ADMIN — GABAPENTIN 100 MG: 100 CAPSULE ORAL at 11:39

## 2019-06-09 ASSESSMENT — ACTIVITIES OF DAILY LIVING (ADL)
ADLS_ACUITY_SCORE: 11

## 2019-06-09 ASSESSMENT — MIFFLIN-ST. JEOR: SCORE: 1963.44

## 2019-06-09 NOTE — PROGRESS NOTES
[facility]    HOSPITALIST PROGRESS NOTE :   --------------------------------------------------    Date of Admission:  6/7/2019    Cumulative Summary: Nigel Diaz is a 54 year old male with past medical history significant for psoriatic arthritis, paroxysmal atrial fibrillation and history of alcoholism who presented with chest pain, shortness of breath and was found to be in atrial fibrillation with RVR.  Patient was admitted for further evaluation and management.    Assessment & Plan     Active Problems:  Atrial fibrillation with RVR (H) (6/8/2019): Unclear etiology, not sure if underlying alcohol use disorder might be playing a role. He was also slightly hypokalemic on admission which might be secondary to alcohol use.  He does have history of cardioversion about 3 to 5 years ago and as far as he knows he has not been in atrial fibrillation since that time.  His CHADs2- VASc score is 0.  Patient was initially started on Cardizem infusion and was admitted as observation with the hope that he might be able to convert back to normal sinus rhythm.  Patient remains in atrial fibrillation with RVR this morning, at rest his heart rate can be in early 100s but they can go as high as 150s with exertion.  He is denying any chest pressure at this point especially when his heart rate is in 110,s.  His initial EKG on admission did not show any acute ischemic changes in his troponin were negative.  He was also noticed to have slight low blood pressure with systolic in the low 90s and required Cardizem infusion to be stopped but restarted yesterday morning due to heart rate being in 120s and 130s, continues to be in A fib this morning     -- Continue to monitor patient closely on telemetry.  -- Continue patient on normal saline at 75 mL/h ,make patient NPO after midnight for possible KRISTEN and cardioversion  -- Continue patient on Cardizem infusion for 5 to 15 mg/h to titrate heart rate less than 1 bpm.  --  continue patient on heparin infusion, might need 4 weeks of AC if undergoes cardioversion tomorrow   -- Cardiology has been consulted, appreciate their help, will follow up on their recommendations.  -- Echocardiogram is done, irregular rhythm and tachycardia made accurate ejection fraction and wall motion assessment somewhat challenging for study, visual ejection fraction is estimated to be 50 to 55% with mild to moderate mitral regurgitation.  -- Start patient on aspirin 81 mg p.o. daily.  -- Continue to monitor and replace electrolytes.  -- He did have long conversation with patient regarding importance of staying abstinent from alcohol as it can definitely contribute to A. fib with RVR.    Psoriasis with psoriatic arthritis: He is on Humira since January 2019 he follows up with his rheumatologist Dr. Doyle in [facility]  --Continue outpatient follow-up.    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  He has had epidural steroid injections in the past and has been undergoing physical therapy.  --Continue outpatient PT.    Alcohol use disorder: Patient does have history of alcoholism and has had outpatient chemical dependency treatment he has had driving related alcohol issues in the past also.  --As above long discussion with patient regarding importance of his staying abstinent from alcohol.  At this time his last alcohol consumption was to state, patient does not have any signs and symptoms of alcohol withdrawal at this point.    Diet: Regular Diet Adult    Cai Catheter: not present  DVT Prophylaxis: Pneumatic Compression Devices and Ambulate every shift  Code Status: Full Code    The patient's care was discussed with the Patient and Patient's Family.    Disposition Plan   Expected discharge: Tomorrow, recommended to prior living arrangement     Parris Rowland MD, FACP  Text Page (7am -  6pm)    ----------------------------------------------------------------------------------------------------------------------      Interval History   Patient was seen and examined.Patient is sitting in bed,feeling better HR is mostly in 110,s but remains in a fib, denying chest pain or SOB.    -Data reviewed today: I reviewed all new labs and imaging results over the last 24 hours.    I personally reviewed no images or EKG's today.    Physical Exam   Temp: 98.5  F (36.9  C) Temp src: Oral BP: (!) 124/93 Pulse: 124            Vitals:    06/07/19 1820 06/08/19 0536 06/09/19 0558   Weight: 96.6 kg (213 lb) 98.6 kg (217 lb 4.8 oz) 99.5 kg (219 lb 6.4 oz)     Vital Signs with Ranges  Temp:  [97.8  F (36.6  C)-98.5  F (36.9  C)] 98.5  F (36.9  C)  Pulse:  [] 124  BP: (103-136)/() 124/93  No intake/output data recorded.    GENERAL: Alert , awake and oriented. NAD. Conversational, appropriate.   HEENT: Normocephalic. EOMI. No icterus or injection. Nares normal.   LUNGS: Clear to auscultation. No dyspnea at rest.   HEART: Irregular and tachycardic, Extremities perfused.   ABDOMEN: Soft, nontender, and nondistended. Positive bowel sounds.   EXTREMITIES: No LE edema noted.   NEUROLOGIC: Moves extremities x4 on command. No acute focal neurologic abnormalities noted.     Medications     diltiazem (CARDIZEM) infusion ADULT 5 mg/hr (06/09/19 1999)     HEParin 1,100 Units/hr (06/09/19 8446)     - MEDICATION INSTRUCTIONS -       sodium chloride 75 mL/hr at 06/08/19 2156       aspirin  81 mg Oral Daily     DULoxetine  30 mg Oral BID     gabapentin  100 mg Oral TID     sodium chloride (PF)  3 mL Intracatheter Q8H     traMADol  100 mg Oral BID       Data   Recent Labs   Lab 06/09/19  0543 06/08/19  0532 06/07/19  1835   WBC  --  6.4 8.4   HGB  --  14.1 16.6   MCV  --  92 91   PLT  --  150 223     --  134   POTASSIUM 3.7  --  3.1*   CHLORIDE 108  --  93*   CO2 29  --  31   BUN 10  --  22   CR 0.68  --  1.05   ANIONGAP  5  --  10   ODALYS 8.0*  --  9.7   *  --  95   TROPI  --   --  <0.015       Imaging:   No results found for this or any previous visit (from the past 24 hour(s)).

## 2019-06-09 NOTE — PLAN OF CARE
Care Plan Summary Note: Pt A&Ox4 , VSS. Denies pain, headaches, or dyspnea. IVF infusing; heparin and diltiazem. Hep Xa labs in AM.  Regular diet; NPO after midnight for KRISTEN and possble cardioversion. Indep.   1840: EKG to confirm rhythm change- Afib with RVR .

## 2019-06-09 NOTE — PLAN OF CARE
Alert and oriented x 4. VS stable, on RA and no complaints of pain. Tele A fib with rates in the 's. He was up walking in the halls x 2 and tolerated that activity well. Plan for KRISTEN and cardioversion tomorrow, consent signed.

## 2019-06-09 NOTE — PROGRESS NOTES
[facility]    Cardiology Progress Note     Assessment & Plan   Mr. Diaz is a 54-year-old male with a background history of alcoholism, psoriatic arthritis (on Humira), chronic neuropathic pains and a history of paroxysmal atrial fibrillation.  Recent history is notable recurrence of atrial fibrillation with RVR.  Patient was admitted for further evaluation and management.     # Paroxysmal atrial fibrillation with rapid ventricular rates  # Alcoholism  # Psoriatic arthritis  - will continue IV diltiazem  - will prepare for KRISTEN/cardioversion tomorrow   ---- ordered are place   ---- patient has provided complaints    Marcelino Mendoza MD    Interval history:  No acute overnight events.  Patient remains in atrial fibrillation.  Patient has no complaints.    Physical Exam   Temp: 98.5  F (36.9  C) Temp src: Oral BP: (!) 124/93 Pulse: 124            Vitals:    06/07/19 1820 06/08/19 0536 06/09/19 0558   Weight: 96.6 kg (213 lb) 98.6 kg (217 lb 4.8 oz) 99.5 kg (219 lb 6.4 oz)     Vital Signs with Ranges  Temp:  [97.8  F (36.6  C)-98.5  F (36.9  C)] 98.5  F (36.9  C)  Pulse:  [] 124  BP: (107-136)/() 124/93  I/O last 3 completed shifts:  In: 400 [P.O.:400]  Out: -   Patient Active Problem List   Diagnosis     ATRIAL FIBRILLATION-intermittent     Chondromalacia of patella     GERD (gastroesophageal reflux disease)     CARDIOVASCULAR SCREENING; LDL GOAL LESS THAN 160     Atrial fibrillation with RVR (H)     A-fib (H)     GENERAL:   No acute distress  VESSELS:  non-elevated JVP.   HEART:  Irregular, variable S1, normal S2.  No murmurs.   LUNGS:  Clear to auscultation bilaterally.   ABDOMEN:  Benign.   EXTREMITIES:  No edema.  Normal perfusion.   SKIN:  Dry, with no rashes.      Medications     diltiazem (CARDIZEM) infusion ADULT 5 mg/hr (06/09/19 0746)     HEParin 1,100 Units/hr (06/09/19 1139)     - MEDICATION INSTRUCTIONS -       sodium chloride 75 mL/hr at 06/08/19 2156       aspirin  81 mg Oral  Daily     DULoxetine  30 mg Oral BID     gabapentin  100 mg Oral TID     sodium chloride (PF)  3 mL Intracatheter Q8H     traMADol  100 mg Oral BID       Data   Results for orders placed or performed during the hospital encounter of 06/07/19 (from the past 24 hour(s))   Heparin Xa level (AM Draw)   Result Value Ref Range    Heparin 10A Level 0.37 IU/mL   Basic metabolic panel   Result Value Ref Range    Sodium 142 133 - 144 mmol/L    Potassium 3.7 3.4 - 5.3 mmol/L    Chloride 108 94 - 109 mmol/L    Carbon Dioxide 29 20 - 32 mmol/L    Anion Gap 5 3 - 14 mmol/L    Glucose 121 (H) 70 - 99 mg/dL    Urea Nitrogen 10 7 - 30 mg/dL    Creatinine 0.68 0.66 - 1.25 mg/dL    GFR Estimate >90 >60 mL/min/[1.73_m2]    GFR Estimate If Black >90 >60 mL/min/[1.73_m2]    Calcium 8.0 (L) 8.5 - 10.1 mg/dL   TSH   Result Value Ref Range    TSH 0.31 (L) 0.40 - 4.00 mU/L   Heparin Xa (10a) Level   Result Value Ref Range    Heparin 10A Level Canceled, Test credited IU/mL   Heparin Xa level (AM Draw)   Result Value Ref Range    Heparin 10A Level Canceled, Test credited IU/mL   Heparin Xa level (AM Draw)   Result Value Ref Range    Heparin 10A Level 0.22 IU/mL   T4 free (Add on or recollect)   Result Value Ref Range    T4 Free 0.98 0.76 - 1.46 ng/dL     Recent Labs   Lab 06/09/19  0543 06/08/19  0532 06/07/19  1835   WBC  --  6.4 8.4   HGB  --  14.1 16.6   MCV  --  92 91   PLT  --  150 223     --  134   POTASSIUM 3.7  --  3.1*   CHLORIDE 108  --  93*   CO2 29  --  31   BUN 10  --  22   CR 0.68  --  1.05   ANIONGAP 5  --  10   ODALYS 8.0*  --  9.7   *  --  95   TROPI  --   --  <0.015     No results found for this or any previous visit (from the past 24 hour(s)).

## 2019-06-09 NOTE — PLAN OF CARE
AOx4, VSS RA, Tele: AF CVR/RVR, HR up to 180's w/activity, hep gtt @ 700, dilt gtt @ 5 ml/hr, NS @ 75ml/hr, Up IND, Hx of alcoholism potential for CIWA protocol as pt became a little agitated at the beginning of shift regarding medication times and during safety checks pt was slightly diaphoretic.  Pt denies CP/SOB/N/V, Plan for KRISTEN & Cardioversion Mon, continue to monitor

## 2019-06-10 ENCOUNTER — ANESTHESIA EVENT (OUTPATIENT)
Dept: SURGERY | Facility: CLINIC | Age: 54
DRG: 310 | End: 2019-06-10
Payer: COMMERCIAL

## 2019-06-10 ENCOUNTER — ANESTHESIA (OUTPATIENT)
Dept: SURGERY | Facility: CLINIC | Age: 54
DRG: 310 | End: 2019-06-10
Payer: COMMERCIAL

## 2019-06-10 ENCOUNTER — APPOINTMENT (OUTPATIENT)
Dept: CARDIOLOGY | Facility: CLINIC | Age: 54
DRG: 310 | End: 2019-06-10
Attending: INTERNAL MEDICINE
Payer: COMMERCIAL

## 2019-06-10 VITALS
SYSTOLIC BLOOD PRESSURE: 109 MMHG | HEIGHT: 78 IN | DIASTOLIC BLOOD PRESSURE: 69 MMHG | RESPIRATION RATE: 15 BRPM | BODY MASS INDEX: 25.35 KG/M2 | OXYGEN SATURATION: 100 % | TEMPERATURE: 97.5 F | HEART RATE: 91 BPM | WEIGHT: 219.1 LBS

## 2019-06-10 LAB
BUN SERPL-MCNC: 11 MG/DL (ref 7–30)
CALCIUM SERPL-MCNC: 8.9 MG/DL (ref 8.5–10.1)
CHLORIDE SERPL-SCNC: 108 MMOL/L (ref 94–109)
CO2 SERPL-SCNC: 29 MMOL/L (ref 20–32)
CREAT SERPL-MCNC: 0.8 MG/DL (ref 0.66–1.25)
GFR SERPL CREATININE-BSD FRML MDRD: >90 ML/MIN/{1.73_M2}
GLUCOSE SERPL-MCNC: 102 MG/DL (ref 70–99)
LMWH PPP CHRO-ACNC: 0.2 IU/ML
LMWH PPP CHRO-ACNC: 0.21 IU/ML
MAGNESIUM SERPL-MCNC: 2 MG/DL (ref 1.6–2.3)
POTASSIUM SERPL-SCNC: 4.2 MMOL/L (ref 3.4–5.3)
SODIUM SERPL-SCNC: 142 MMOL/L (ref 133–144)

## 2019-06-10 PROCEDURE — 85520 HEPARIN ASSAY: CPT | Performed by: INTERNAL MEDICINE

## 2019-06-10 PROCEDURE — 92960 CARDIOVERSION ELECTRIC EXT: CPT

## 2019-06-10 PROCEDURE — 25000128 H RX IP 250 OP 636: Performed by: NURSE ANESTHETIST, CERTIFIED REGISTERED

## 2019-06-10 PROCEDURE — 93010 ELECTROCARDIOGRAM REPORT: CPT | Performed by: INTERNAL MEDICINE

## 2019-06-10 PROCEDURE — 99238 HOSP IP/OBS DSCHRG MGMT 30/<: CPT | Performed by: INTERNAL MEDICINE

## 2019-06-10 PROCEDURE — 40000235 ZZH STATISTIC TELEMETRY

## 2019-06-10 PROCEDURE — 40000857 ZZH STATISTIC TEE INCLUDES SEDATION

## 2019-06-10 PROCEDURE — 36415 COLL VENOUS BLD VENIPUNCTURE: CPT | Performed by: INTERNAL MEDICINE

## 2019-06-10 PROCEDURE — 25000128 H RX IP 250 OP 636: Performed by: INTERNAL MEDICINE

## 2019-06-10 PROCEDURE — 93312 ECHO TRANSESOPHAGEAL: CPT | Mod: 26 | Performed by: INTERNAL MEDICINE

## 2019-06-10 PROCEDURE — 25000125 ZZHC RX 250: Performed by: EMERGENCY MEDICINE

## 2019-06-10 PROCEDURE — 25000132 ZZH RX MED GY IP 250 OP 250 PS 637: Performed by: INTERNAL MEDICINE

## 2019-06-10 PROCEDURE — 93325 DOPPLER ECHO COLOR FLOW MAPG: CPT | Mod: 26 | Performed by: INTERNAL MEDICINE

## 2019-06-10 PROCEDURE — 25000132 ZZH RX MED GY IP 250 OP 250 PS 637: Performed by: HOSPITALIST

## 2019-06-10 PROCEDURE — 25800030 ZZH RX IP 258 OP 636: Performed by: INTERNAL MEDICINE

## 2019-06-10 PROCEDURE — 5A2204Z RESTORATION OF CARDIAC RHYTHM, SINGLE: ICD-10-PCS | Performed by: INTERNAL MEDICINE

## 2019-06-10 PROCEDURE — 83735 ASSAY OF MAGNESIUM: CPT | Performed by: INTERNAL MEDICINE

## 2019-06-10 PROCEDURE — 93320 DOPPLER ECHO COMPLETE: CPT

## 2019-06-10 PROCEDURE — 40000010 ZZH STATISTIC ANES STAT CODE-CRNA PER MINUTE

## 2019-06-10 PROCEDURE — 25800030 ZZH RX IP 258 OP 636: Performed by: EMERGENCY MEDICINE

## 2019-06-10 PROCEDURE — 93005 ELECTROCARDIOGRAM TRACING: CPT

## 2019-06-10 PROCEDURE — 92960 CARDIOVERSION ELECTRIC EXT: CPT | Performed by: INTERNAL MEDICINE

## 2019-06-10 PROCEDURE — 99232 SBSQ HOSP IP/OBS MODERATE 35: CPT | Mod: 25 | Performed by: INTERNAL MEDICINE

## 2019-06-10 PROCEDURE — 37000008 ZZH ANESTHESIA TECHNICAL FEE, 1ST 30 MIN

## 2019-06-10 PROCEDURE — 25000125 ZZHC RX 250: Performed by: INTERNAL MEDICINE

## 2019-06-10 PROCEDURE — 93320 DOPPLER ECHO COMPLETE: CPT | Mod: 26 | Performed by: INTERNAL MEDICINE

## 2019-06-10 PROCEDURE — 80048 BASIC METABOLIC PNL TOTAL CA: CPT | Performed by: INTERNAL MEDICINE

## 2019-06-10 RX ORDER — NALOXONE HYDROCHLORIDE 0.4 MG/ML
.1-.4 INJECTION, SOLUTION INTRAMUSCULAR; INTRAVENOUS; SUBCUTANEOUS
Status: DISCONTINUED | OUTPATIENT
Start: 2019-06-10 | End: 2019-06-10

## 2019-06-10 RX ORDER — LIDOCAINE HYDROCHLORIDE 40 MG/ML
1.5 SOLUTION TOPICAL ONCE
Status: COMPLETED | OUTPATIENT
Start: 2019-06-10 | End: 2019-06-10

## 2019-06-10 RX ORDER — GLYCOPYRROLATE 0.2 MG/ML
0.1 INJECTION, SOLUTION INTRAMUSCULAR; INTRAVENOUS ONCE
Status: COMPLETED | OUTPATIENT
Start: 2019-06-10 | End: 2019-06-10

## 2019-06-10 RX ORDER — FENTANYL CITRATE 50 UG/ML
50 INJECTION, SOLUTION INTRAMUSCULAR; INTRAVENOUS ONCE
Status: DISCONTINUED | OUTPATIENT
Start: 2019-06-10 | End: 2019-06-10 | Stop reason: HOSPADM

## 2019-06-10 RX ORDER — SODIUM CHLORIDE 9 MG/ML
INJECTION, SOLUTION INTRAVENOUS CONTINUOUS PRN
Status: DISCONTINUED | OUTPATIENT
Start: 2019-06-10 | End: 2019-06-10 | Stop reason: HOSPADM

## 2019-06-10 RX ORDER — FENTANYL CITRATE 50 UG/ML
25 INJECTION, SOLUTION INTRAMUSCULAR; INTRAVENOUS
Status: DISCONTINUED | OUTPATIENT
Start: 2019-06-10 | End: 2019-06-10 | Stop reason: HOSPADM

## 2019-06-10 RX ORDER — POTASSIUM CHLORIDE 1500 MG/1
40 TABLET, EXTENDED RELEASE ORAL
Status: DISCONTINUED | OUTPATIENT
Start: 2019-06-10 | End: 2019-06-10 | Stop reason: HOSPADM

## 2019-06-10 RX ORDER — FLUMAZENIL 0.1 MG/ML
0.2 INJECTION, SOLUTION INTRAVENOUS
Status: DISCONTINUED | OUTPATIENT
Start: 2019-06-10 | End: 2019-06-10 | Stop reason: HOSPADM

## 2019-06-10 RX ORDER — POTASSIUM CHLORIDE 1500 MG/1
20 TABLET, EXTENDED RELEASE ORAL
Status: DISCONTINUED | OUTPATIENT
Start: 2019-06-10 | End: 2019-06-10 | Stop reason: HOSPADM

## 2019-06-10 RX ORDER — MAGNESIUM SULFATE HEPTAHYDRATE 40 MG/ML
2 INJECTION, SOLUTION INTRAVENOUS
Status: DISCONTINUED | OUTPATIENT
Start: 2019-06-10 | End: 2019-06-10 | Stop reason: HOSPADM

## 2019-06-10 RX ORDER — LIDOCAINE 40 MG/G
CREAM TOPICAL
Status: DISCONTINUED | OUTPATIENT
Start: 2019-06-10 | End: 2019-06-10

## 2019-06-10 RX ORDER — ACYCLOVIR 200 MG/1
9.5 CAPSULE ORAL
Status: DISCONTINUED | OUTPATIENT
Start: 2019-06-10 | End: 2019-06-10 | Stop reason: HOSPADM

## 2019-06-10 RX ORDER — PROPOFOL 10 MG/ML
INJECTION, EMULSION INTRAVENOUS PRN
Status: DISCONTINUED | OUTPATIENT
Start: 2019-06-10 | End: 2019-06-10

## 2019-06-10 RX ADMIN — FENTANYL CITRATE 25 MCG: 50 INJECTION, SOLUTION INTRAMUSCULAR; INTRAVENOUS at 13:24

## 2019-06-10 RX ADMIN — PROPOFOL 100 MG: 10 INJECTION, EMULSION INTRAVENOUS at 13:47

## 2019-06-10 RX ADMIN — ASPIRIN 81 MG: 81 TABLET, COATED ORAL at 09:06

## 2019-06-10 RX ADMIN — FENTANYL CITRATE 25 MCG: 50 INJECTION, SOLUTION INTRAMUSCULAR; INTRAVENOUS at 13:16

## 2019-06-10 RX ADMIN — MIDAZOLAM HYDROCHLORIDE 1 MG: 1 INJECTION, SOLUTION INTRAMUSCULAR; INTRAVENOUS at 13:16

## 2019-06-10 RX ADMIN — MIDAZOLAM HYDROCHLORIDE 1 MG: 1 INJECTION, SOLUTION INTRAMUSCULAR; INTRAVENOUS at 13:18

## 2019-06-10 RX ADMIN — GLYCOPYRROLATE 0.1 MG: 0.2 INJECTION, SOLUTION INTRAMUSCULAR; INTRAVENOUS at 12:41

## 2019-06-10 RX ADMIN — LIDOCAINE HYDROCHLORIDE 1.5 ML: 40 SOLUTION TOPICAL at 12:39

## 2019-06-10 RX ADMIN — GABAPENTIN 100 MG: 100 CAPSULE ORAL at 06:15

## 2019-06-10 RX ADMIN — TRAMADOL HYDROCHLORIDE 100 MG: 50 TABLET, COATED ORAL at 06:16

## 2019-06-10 RX ADMIN — FENTANYL CITRATE 25 MCG: 50 INJECTION, SOLUTION INTRAMUSCULAR; INTRAVENOUS at 13:18

## 2019-06-10 RX ADMIN — DILTIAZEM HYDROCHLORIDE 5 MG/HR: 5 INJECTION INTRAVENOUS at 12:12

## 2019-06-10 RX ADMIN — SODIUM CHLORIDE: 9 INJECTION, SOLUTION INTRAVENOUS at 12:43

## 2019-06-10 RX ADMIN — APIXABAN 5 MG: 5 TABLET, FILM COATED ORAL at 10:01

## 2019-06-10 RX ADMIN — MIDAZOLAM HYDROCHLORIDE 1 MG: 1 INJECTION, SOLUTION INTRAMUSCULAR; INTRAVENOUS at 13:21

## 2019-06-10 RX ADMIN — TOPICAL ANESTHETIC 1 ML: 200 SPRAY DENTAL; PERIODONTAL at 13:05

## 2019-06-10 RX ADMIN — DULOXETINE HYDROCHLORIDE 30 MG: 30 CAPSULE, DELAYED RELEASE ORAL at 06:15

## 2019-06-10 ASSESSMENT — ENCOUNTER SYMPTOMS: DYSRHYTHMIAS: 1

## 2019-06-10 ASSESSMENT — ACTIVITIES OF DAILY LIVING (ADL)
ADLS_ACUITY_SCORE: 11

## 2019-06-10 ASSESSMENT — LIFESTYLE VARIABLES: TOBACCO_USE: 0

## 2019-06-10 ASSESSMENT — MIFFLIN-ST. JEOR: SCORE: 1962.08

## 2019-06-10 ASSESSMENT — COPD QUESTIONNAIRES: COPD: 0

## 2019-06-10 NOTE — ANESTHESIA PREPROCEDURE EVALUATION
Anesthesia Pre-Procedure Evaluation    Patient: Nigel Diaz   MRN: [id number] : 1965          Preoperative Diagnosis: UNKNOWN    Procedure(s):  ANESTHESIA, FOR CARDIOVERSION    Past Medical History:   Diagnosis Date     Atrial fibrillation (H)     intermittent     Atrial fibrillation (H)      Past Surgical History:   Procedure Laterality Date     CYSTECTOMY BRANCHIAL CLEFT  9/7/10    Excision right branchial cleft cyst. - [facility]     REMOVAL OF SPERM DUCT(S)         Anesthesia Evaluation     . Pt has had prior anesthetic. Type: General    No history of anesthetic complications          ROS/MED HX    ENT/Pulmonary:      (-) tobacco use, asthma and COPD   Neurologic:      (-) CVA, TIA and Neuropathy   Cardiovascular:     (+) ----. : . . . :. dysrhythmias a-fib, .      (-) hypertension, CAD, irregular heartbeat/palpitations and stent   METS/Exercise Tolerance:     Hematologic:        (-) anemia   Musculoskeletal:   (+) arthritis,  -       GI/Hepatic:     (+) GERD      (-) liver disease   Renal/Genitourinary:      (-) renal disease   Endo:      (-) Type I DM, Type II DM and thyroid disease   Psychiatric:         Infectious Disease:  - neg infectious disease ROS       Malignancy:         Other: Comment: EtOH abuse                         Physical Exam  Normal systems: pulmonary and dental    Airway   Mallampati: II  TM distance: >3 FB  Neck ROM: full    Dental     Cardiovascular   Rhythm and rate: irregular and normal      Pulmonary    breath sounds clear to auscultation            Lab Results   Component Value Date    WBC 6.4 2019    HGB 14.1 2019    HCT 41.1 2019     2019    SED 5 10/13/2011     06/10/2019    POTASSIUM 4.2 06/10/2019    CHLORIDE 108 06/10/2019    CO2 29 06/10/2019    BUN 11 06/10/2019    CR 0.80 06/10/2019     (H) 06/10/2019    ODALYS 8.9 06/10/2019    MAG 2.0 06/10/2019    ALBUMIN 4.6 2010    PROTTOTAL 7.9 2010    ALT 25 2010     "AST 40 08/09/2010    ALKPHOS 61 08/09/2010    BILITOTAL 1.3 08/09/2010    TSH 0.31 (L) 06/09/2019    T4 0.98 06/09/2019       Preop Vitals  BP Readings from Last 3 Encounters:   06/10/19 102/74   03/28/19 117/77   03/01/19 112/70    Pulse Readings from Last 3 Encounters:   06/10/19 72   03/28/19 89   03/01/19 144      Resp Readings from Last 3 Encounters:   06/10/19 10   03/28/19 16   03/01/19 14    SpO2 Readings from Last 3 Encounters:   06/10/19 100%   03/28/19 95%   03/01/19 96%      Temp Readings from Last 1 Encounters:   06/10/19 36.6  C (97.8  F) (Oral)    Ht Readings from Last 1 Encounters:   06/07/19 1.981 m (6' 6\")      Wt Readings from Last 1 Encounters:   06/10/19 99.4 kg (219 lb 1.6 oz)    Estimated body mass index is 25.32 kg/m  as calculated from the following:    Height as of this encounter: 1.981 m (6' 6\").    Weight as of this encounter: 99.4 kg (219 lb 1.6 oz).       Anesthesia Plan      History & Physical Review  History and physical reviewed and following examination; no interval change.    ASA Status:  2 .    NPO Status:  > 6 hours    Plan for General with Intravenous induction.          Postoperative Care      Consents  Anesthetic plan, risks, benefits and alternatives discussed with:  Patient..                 Juan Ramon He MD  "

## 2019-06-10 NOTE — DISCHARGE INSTRUCTIONS
Cardioversion Discharge Instructions    After you go home:       For 24 hours - due to the sedation you received:      Have an adult stay with you for 24 hours.     Relax and take it easy.    Do NOT make any important or legal decisions.    Do NOT drive or operate machines at home or at work.    Do NOT drink alcohol.    Diet:      Start with clear liquids and progress to your normal diet as you feel able.    Medicines:      Take your medications, including blood thinners, unless your provider tells you not to.    If you have stopped any medications, check with your provider about when to restart them.    Follow Up Appointments:      Follow up with your cardiologist at [facility] of patient preference as instructed.    Follow up with your primary care provider as needed.    Post cardioversion:    The skin on your chest or back may feel tender for 48 hours.  If your skin is tender, you may:      Use a cold pack on the site. Never use ice directly on your skin. Use the cold pack for 20 minutes. Remove it for at least 30 minutes before re-using.    Apply 1% hydrocortisone cream to the skin (sold at drug stores)    Take Advil (Ibuprofen) or Tylenol (Acetaminophen) per your provider's recommendations.      Call your provider if you have:      Weakness, dizziness, lightheadedness, or fainting.    Shortness of breath.    Irregular heartbeat, feelings of your heart fluttering or beating fast, hard or palpitations.     More than minor skin discomfort or redness where the cardioversion pads were placed.    Questions or concerns.      Call 911 if you have:      Pain in your chest, arm, shoulder, neck, or upper back.    You have problems speaking or seeing.    Weakness in your arm or leg.    You are unable to move your arm or leg.    You have uncontrolled bleeding.         [facility] [place]:    [phone] [facility] (7 days a week)

## 2019-06-10 NOTE — PROCEDURES
Cardioversion Note    Informed consent was signed by the patient.  A timeout was taken.    Anesthesia was present for cardioversion, see separate documentation for their sedation.    Baseline rhythm: Atrial fibrillation with RVR  Synchronized cardioversion performed at  120J  Post-DCCV rhythm: Sinus rhythm    No complications.  Diltiazem gtt stopped  Return to inpatient [facility] for ongoing management per primary service.          Kristy Mei MD  Cardiology - [facility]  Mabel 10, 2019

## 2019-06-10 NOTE — PROGRESS NOTES
[facility]    Cardiology Progress Note     Assessment & Plan   Mr. Diaz is a 54-year-old male with a background history of alcoholism, psoriatic arthritis (on Humira), chronic neuropathic pains and a history of paroxysmal atrial fibrillation.  Recent history is notable recurrence of atrial fibrillation with RVR.  Patient was admitted for further evaluation and management.     # Paroxysmal atrial fibrillation with rapid ventricular rates, now controlled on IV dilt. Typically very rare episodes. Last cardioversion 5 years ago. A few brief episodes possible since then, not requiring treatment until now. Did not tolerate beta blockers due to severe fatigue. CHADS VASC score of zero.   # Gastroenteritis, probably causing dehydration and contributing to onset of afib. Now resolved.  # Alcoholism, possible contributing factor to afib onset  # Psoriatic arthritis    - will continue IV diltiazem for rate control.   - KRISTEN/cardioversion today   Will stop IV heparin and start Eliquis 5 mg bid now. He will need 4 weeks of Eliquis after cardioversion and then daily aspirin. I would not start antiarrythmic therapy at this time.    Follow up sleep study and moderation of alcohol recommended.     Plan to discharge after cardioversion.     WALTER HIGGINS MD    Interval history:  No acute overnight events.  Patient remains in atrial fibrillation.  Patient has no complaints.    Physical Exam   Temp: 97.8  F (36.6  C) Temp src: Oral BP: 112/82 Pulse: 88 Heart Rate: 92 Resp: 16 SpO2: 97 % O2 Device: None (Room air)    Vitals:    06/08/19 0536 06/09/19 0558 06/10/19 0545   Weight: 98.6 kg (217 lb 4.8 oz) 99.5 kg (219 lb 6.4 oz) 99.4 kg (219 lb 1.6 oz)     Vital Signs with Ranges  Temp:  [97.6  F (36.4  C)-97.8  F (36.6  C)] 97.8  F (36.6  C)  Pulse:  [] 88  Heart Rate:  [92] 92  Resp:  [16-18] 16  BP: (104-112)/(76-82) 112/82  SpO2:  [97 %-98 %] 97 %  I/O last 3 completed shifts:  In: 1120 [P.O.:1120]  Out:  1100 [Urine:1100]  Patient Active Problem List   Diagnosis     ATRIAL FIBRILLATION-intermittent     Chondromalacia of patella     GERD (gastroesophageal reflux disease)     CARDIOVASCULAR SCREENING; LDL GOAL LESS THAN 160     Atrial fibrillation with RVR (H)     A-fib (H)     GENERAL:   No acute distress  VESSELS:  non-elevated JVP.   HEART:  Irregular, variable S1, normal S2.  No murmurs.   LUNGS:  Clear to auscultation bilaterally.   ABDOMEN:  Benign.   EXTREMITIES:  No edema.  Normal perfusion.   SKIN:  Dry, with no rashes.      Medications     diltiazem (CARDIZEM) infusion ADULT 5 mg/hr (06/10/19 0150)     - MEDICATION INSTRUCTIONS -       sodium chloride 75 mL/hr at 06/08/19 1121       apixaban ANTICOAGULANT  5 mg Oral BID     aspirin  81 mg Oral Daily     DULoxetine  30 mg Oral BID     gabapentin  100 mg Oral TID     sodium chloride (PF)  3 mL Intracatheter Q8H     traMADol  100 mg Oral BID       Data   Results for orders placed or performed during the hospital encounter of 06/07/19 (from the past 24 hour(s))   EKG 12-lead, tracing only   Result Value Ref Range    Interpretation ECG Click View Image link to view waveform and result    Basic metabolic panel   Result Value Ref Range    Sodium 143 133 - 144 mmol/L    Potassium 4.0 3.4 - 5.3 mmol/L    Chloride 106 94 - 109 mmol/L    Carbon Dioxide 33 (H) 20 - 32 mmol/L    Anion Gap 4 3 - 14 mmol/L    Glucose 103 (H) 70 - 99 mg/dL    Urea Nitrogen 13 7 - 30 mg/dL    Creatinine 0.80 0.66 - 1.25 mg/dL    GFR Estimate >90 >60 mL/min/[1.73_m2]    GFR Estimate If Black >90 >60 mL/min/[1.73_m2]    Calcium 8.7 8.5 - 10.1 mg/dL   Magnesium   Result Value Ref Range    Magnesium 1.6 1.6 - 2.3 mg/dL   Heparin Xa (10a) Level   Result Value Ref Range    Heparin 10A Level 0.21 IU/mL   Heparin Xa level (AM Draw)   Result Value Ref Range    Heparin 10A Level 0.20 IU/mL   Potassium   Result Value Ref Range    Potassium 4.2 3.4 - 5.3 mmol/L     Recent Labs   Lab 06/10/19  8326  06/09/19 2020 06/09/19  0543 06/08/19  0532 06/07/19  1835   WBC  --   --   --  6.4 8.4   HGB  --   --   --  14.1 16.6   MCV  --   --   --  92 91   PLT  --   --   --  150 223   NA  --  143 142  --  134   POTASSIUM 4.2 4.0 3.7  --  3.1*   CHLORIDE  --  106 108  --  93*   CO2  --  33* 29  --  31   BUN  --  13 10  --  22   CR  --  0.80 0.68  --  1.05   ANIONGAP  --  4 5  --  10   ODALYS  --  8.7 8.0*  --  9.7   GLC  --  103* 121*  --  95   TROPI  --   --   --   --  <0.015     No results found for this or any previous visit (from the past 24 hour(s)).

## 2019-06-10 NOTE — DISCHARGE SUMMARY
[facility]  Hospitalist Discharge Summary       Date of Admission:  6/7/2019  Date of Discharge:  6/10/2019  Discharging Provider: Parris Rowland MD      Discharge Diagnoses   Atrial fibrillation with RVR, status post cardioversion.  Psoriasis with psoriatic arthritis.  Cervical spine disease.  Alcohol use disorder.    Follow-ups Needed After Discharge   Follow-up Appointments     Follow-up and recommended labs and tests      Follow up with primary care provider, Nestor Simmons, within 7 days to   evaluate treatment change and for hospital follow- up.  No follow up labs   or test are needed.     For primary care physician: Patient is discharged on Eliquis for 1 month, he is advised to start taking aspirin 81 mg once he is done with the Eliquis.  Patient is also advised to get outpatient sleep study.      Unresulted Labs Ordered in the Past 30 Days of this Admission     No orders found from 4/8/2019 to 6/8/2019.          Discharge Disposition   Discharged to home  Condition at discharge: Stable    Hospital Course   Cumulative Summary: Nigel Diaz is a 54 year old male with past medical history significant for psoriatic arthritis, paroxysmal atrial fibrillation and history of alcoholism who presented with chest pain, shortness of breath and was found to be in atrial fibrillation with RVR.  Patient was admitted for further evaluation and management.  Here are further details regarding his hospitalization :     Active Problems:  Atrial fibrillation with RVR (H) (6/8/2019): Unclear etiology, not sure if underlying alcohol use disorder p[layed some role. He was also slightly hypokalemic on admission which might be secondary to alcohol use.  He does have history of cardioversion about 3 to 5 years ago and as far as he knows he has not been in atrial fibrillation since that time.  His CHADs2- VASc score is 0.   Patient was initially started on Cardizem infusion and was admitted as observation with the hope  that he might be able to convert back to normal sinus rhythm.  Patient remained in A.fib with RVR , cardiology was also consulted, patient was also a started on heparin infusion along with Cardizem.  Echocardiogram was done due to irregular rhythm and tachycardia acute rate ejection fraction and wall motion assessment was challenging, visual ejection fraction was estimated to be 50 to 55% with mild to moderate MR.  Patient underwent successful cardioversion on Mabel 10 and tolerated the procedure well.    --Patient is planned to stay on anticoagulation for 4 weeks post cardioversion, he was a started on Eliquis 5 mg p.o. twice daily.  --He is advised to start taking baby aspirin after he is done with the course of Eliquis.  --He is also advised to cut down on his alcohol consumption.  -- Outpatient sleep study.     Psoriasis with psoriatic arthritis: He is on Humira since January 2019 he follows up with his rheumatologist Dr. Doyle in [facility]  --Continue outpatient follow-up.     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  He has had epidural steroid injections in the past and has been undergoing physical therapy.  --Continue outpatient PT.     Alcohol use disorder: Patient does have history of alcoholism and has had outpatient chemical dependency treatment he has had driving related alcohol issues in the past also.  --Multiple conversations have been done during this hospitalization regarding importance of his staying abstinent from alcohol.  Patient is planning to stay sober.     Patient was seen and examined on the day of discharge , he is feeling well, does not have any complaints , I did review the discharge medications and instructions with the patient and plan for him to follow up with the PCP after the hospitalization .patient was in agreement , he is discharged in stable condition back to his home.    Consultations This Hospital Stay   CARDIOLOGY IP  CONSULT  PHARMACY IP CONSULT  PHARMACY IP CONSULT  PHARMACY TO DOSE HEPARIN  PHARMACY LIAISON FOR MEDICATION COVERAGE CONSULT  SMOKING CESSATION PROGRAM IP CONSULT    Code Status   Full Code    Time Spent on this Encounter   I, Parris Rowland, personally saw the patient today and spent less than or equal to 30 minutes discharging this patient.     Parris Rowland MD  [facility]  ______________________________________________________________________    Physical Exam   Vital Signs: Temp: 97.8  F (36.6  C) Temp src: Oral BP: 103/72 Pulse: 72 Heart Rate: 92 Resp: 13 SpO2: 100 % O2 Device: Nasal cannula Oxygen Delivery: 2 LPM  Weight: 219 lbs 1.6 oz     GENERAL: Alert , awake and oriented. NAD. Conversational, appropriate.   HEENT: Normocephalic. EOMI. No icterus or injection. Nares normal.   LUNGS: Clear to auscultation. No dyspnea at rest.   HEART: regular , Extremities perfused.   ABDOMEN: Soft, nontender, and nondistended. Positive bowel sounds.   EXTREMITIES: No LE edema noted.   NEUROLOGIC: Moves extremities x4 on command. No acute focal neurologic abnormalities noted.        Primary Care Physician   Nestor Simmons    Discharge Orders      Reason for your hospital stay    You were admitted to the hospital secondary to Atrial fibrillation , you underwent cardioversion and tolerated the procedure well.     Follow-up and recommended labs and tests    Follow up with primary care provider, Nestor Simmons, within 7 days to evaluate treatment change and for hospital follow- up.  No follow up labs or test are needed.     Activity    Your activity upon discharge: activity as tolerated and no driving for today     Discharge Instructions    You were admitted to the hospital secondary to atrial fibrillation, you underwent successful cardioversion.  He was restarted on blood thinner Eliquis 5 mg 2 times a day to be taken for 1 month.  Please restart taking aspirin 81 mg p.o. daily after you are done with Eliquis course.   Please to stay abstinent from alcohol.  You are also advised to get outpatient sleep study.  Please discuss with your primary care physician so he can give you referral.     Full Code     Diet    Follow this diet upon discharge: Orders Placed This Encounter      Regular Diet Adult         Significant Results and Procedures   Results for orders placed or performed during the hospital encounter of 03/01/19   Head CT w/o contrast    Narrative    CT SCAN OF THE HEAD WITHOUT CONTRAST   3/1/2019 5:29 PM     HISTORY: Fall, laceration above right eyebrow.    TECHNIQUE: Axial images of the head and coronal reformations without  IV contrast material. Radiation dose for this scan was reduced using  automated exposure control, adjustment of the mA and/or kV according  to patient size, or iterative reconstruction technique.    COMPARISON: None.    FINDINGS: There is no evidence of intracranial hemorrhage, mass, acute  infarct or anomaly. The ventricles are normal in size, shape and  configuration. The brain parenchyma and subarachnoid spaces are  normal.     Marked mucosal thickening in the visualized paranasal sinuses,  particularly the ethmoid air cells.    Soft tissue injury over the right supraorbital region. No underlying  skull fracture.      Impression    IMPRESSION:     1. No evidence of acute intracranial hemorrhage, mass, or herniation.  2. Soft tissue injury over the right supraorbital region without  underlying skull fracture.      ALYSE JEFFERSON MD       Discharge Medications   Current Discharge Medication List      START taking these medications    Details   apixaban ANTICOAGULANT (ELIQUIS) 5 MG tablet Take 1 tablet (5 mg) by mouth 2 times daily  Qty: 60 tablet, Refills: 0    Associated Diagnoses: Atrial fibrillation with RVR (H)      aspirin (ASA) 81 MG EC tablet Take 1 tablet (81 mg) by mouth daily    Comments: Please start taking ASA once you are done with 30 days of Eliquis  Associated Diagnoses: Atrial  fibrillation with RVR (H)         CONTINUE these medications which have NOT CHANGED    Details   adalimumab (HUMIRA) 40 MG/0.8ML prefilled syringe kit Inject 40 mg Subcutaneous every 14 days      DULoxetine (CYMBALTA) 30 MG capsule Take 30 mg by mouth 2 times daily      gabapentin (NEURONTIN) 100 MG capsule Take 100 mg by mouth 3 times daily      multivitamin w/minerals (THERA-VIT-M) tablet Take 1 tablet by mouth daily      traMADol (ULTRAM) 50 MG tablet Take 100 mg by mouth 2 times daily          STOP taking these medications       cephALEXin (KEFLEX) 500 MG capsule Comments:   Reason for Stopping:             Allergies   Allergies   Allergen Reactions     No Known Drug Allergies

## 2019-06-10 NOTE — PLAN OF CARE
VSS, O2 sats 97% RA. Up independent in room. Denies pain or problems, AFIB CVR on Dilt drip at 5 mg/hr since yesterday, Heparin infusing as ordered. KRISTEN and Cardioversion planned for today. Alarms on.

## 2019-06-10 NOTE — ANESTHESIA CARE TRANSFER NOTE
Patient: Nigel Diaz    Procedure(s):  ANESTHESIA, FOR CARDIOVERSION    Diagnosis: UNKNOWN  Diagnosis Additional Information: No value filed.    Anesthesia Type:   General     Note:  Airway :Nasal Cannula  Patient transferred to:Telemetry/Step Down Unit  Comments: Patient spontaneously breathing and following commands. VSS. Report given to RN.Handoff Report: Identifed the Patient, Identified the Reponsible Provider, Reviewed the pertinent medical history, Discussed the surgical course, Reviewed Intra-OP anesthesia mangement and issues during anesthesia, Set expectations for post-procedure period and Allowed opportunity for questions and acknowledgement of understanding      Vitals: (Last set prior to Anesthesia Care Transfer)    CRNA VITALS  6/10/2019 1324 - 6/10/2019 1405      6/10/2019             SpO2:  99 %      106/77  HR 77          Electronically Signed By: SUSAN Curiel CRNA  Mabel 10, 2019  2:05 PM

## 2019-06-10 NOTE — CONSULTS
Medication coverage check for Eliquis. $0 copay.    Greta Pierre CphT  [facility] Discharge Pharmacy Liaison  Liaison Cell: [phone]

## 2019-06-10 NOTE — ANESTHESIA POSTPROCEDURE EVALUATION
Patient: Nigel Diaz    Procedure(s):  ANESTHESIA, FOR CARDIOVERSION    Diagnosis:UNKNOWN  Diagnosis Additional Information: No value filed.    Anesthesia Type:  General    Note:  Anesthesia Post Evaluation    Patient location during evaluation: PACU  Patient participation: Able to fully participate in evaluation  Level of consciousness: awake and alert  Pain management: adequate  Airway patency: patent  Cardiovascular status: acceptable  Respiratory status: acceptable  Hydration status: acceptable  PONV: none     Anesthetic complications: None          Last vitals:  Vitals:    06/10/19 1400 06/10/19 1413 06/10/19 1429   BP: 95/70 100/70 103/72   Pulse: 72 72    Resp: 8 13    Temp:   36.6  C (97.8  F)   SpO2: 98% 99% 100%         Electronically Signed By: Juan Ramon He MD  Mabel 10, 2019  2:55 PM

## 2019-06-10 NOTE — PROGRESS NOTES
VSS no pain at time of discharge. Pt states all belongings are accounted for. Discharge education complete, including meds and follow-up instructions. Prescriptions have been filled in-house and are with patient. Family here to drive patient home. Pt remains in NSR post cardioversion and while ambulating.

## 2019-06-10 NOTE — PROGRESS NOTES
Assessed pt for KRISTEN/DCCV. Denies c/o. Afib with CVR. VSS. No trouble swallowing. Labs WDL. Took eliquis this am. Cardizem gtt infusing. Consent signed. Time out preformed with Dr Cooley.     1330 KRISTEN completed. Pt tolerated procedure well. Stable Total sedation Versed 3 mg and Fentanyl 75 mcg. No clot noted. CRNA and MDA here for DCCV.    1348 DCCV 120j x1 shock to SR.    1430 Pt stable. Awake. Denies c/o. SR. VSS. Report to Luba SLAUGHTER RN and pt back to room.

## 2019-06-11 ENCOUNTER — TELEPHONE (OUTPATIENT)
Dept: CARDIOLOGY | Facility: CLINIC | Age: 54
End: 2019-06-11

## 2019-06-11 LAB
INTERPRETATION ECG - MUSE: NORMAL
INTERPRETATION ECG - MUSE: NORMAL

## 2019-06-11 NOTE — TELEPHONE ENCOUNTER
Patient was evaluated by cardiology while inpatient for symptomatic PAF, HONG, chest pressure, N/V. ETOH misuse. PMH of DCCV 5 yrs ago. Rate controlled with IV Diltiazem, but required KRISTEN with DCCV on 6/10/19 to convert to NSR. Started on Eliquis, does not require AA at this time. Attempted to call patient to discuss any post hospital d/c questions he may have, review medication changes, and confirm f/u appts, but no answer and unable to leave VM. Will try again later. RN will confirm with patient that he has an apt scheduled on 7/11/19 with MARSHALL Linda Ro at our [place] office. 7/23/19 OP sleep evaluation scheduled. Will remind pt ETOH only in moderation. TRAVIS Pruitt RN.

## 2019-06-19 NOTE — TELEPHONE ENCOUNTER
Pt called back and he palpitations, chest pain, SOB or lightheadedness. Taking medications as prescribed without further questions. F/U OV dates and times as below reviewed and pt verbalized understanding without further questions. TRAVIS Pruitt RN.

## 2019-06-19 NOTE — TELEPHONE ENCOUNTER
Second attempt at trying to contact pt, and again, no answer. VM left to return my phone call. TRAVIS Pruitt RN.

## 2019-07-23 ENCOUNTER — OFFICE VISIT (OUTPATIENT)
Dept: SLEEP MEDICINE | Facility: CLINIC | Age: 54
End: 2019-07-23
Payer: COMMERCIAL

## 2019-07-23 VITALS
OXYGEN SATURATION: 99 % | WEIGHT: 227 LBS | BODY MASS INDEX: 26.27 KG/M2 | SYSTOLIC BLOOD PRESSURE: 124 MMHG | HEIGHT: 78 IN | DIASTOLIC BLOOD PRESSURE: 62 MMHG | HEART RATE: 89 BPM

## 2019-07-23 DIAGNOSIS — G47.33 OSA (OBSTRUCTIVE SLEEP APNEA): Primary | ICD-10-CM

## 2019-07-23 DIAGNOSIS — I48.91 ATRIAL FIBRILLATION WITH RVR (H): ICD-10-CM

## 2019-07-23 PROCEDURE — 99205 OFFICE O/P NEW HI 60 MIN: CPT | Performed by: INTERNAL MEDICINE

## 2019-07-23 RX ORDER — DILTIAZEM HYDROCHLORIDE 120 MG/1
120 CAPSULE, EXTENDED RELEASE ORAL
Refills: 0 | COMMUNITY
Start: 2019-07-17

## 2019-07-23 ASSESSMENT — MIFFLIN-ST. JEOR: SCORE: 1997.92

## 2019-07-23 NOTE — PATIENT INSTRUCTIONS
Drive Safe... Drive Alive     Sleep health profoundly affects your health, mood, and your safety. 33% of the population (one in three of us) is not getting enough sleep and many have a sleep disorder. Not getting enough sleep or having an untreated / undertreated sleep condition may make us sleepy without even knowing it. In fact, our driving could be dramatically impaired due to our sleep health. As your provider, here are some things I would like you to know about driving:     Here are some warning signs for impairment and dangerous drowsy driving:              -Having been awake more than 16 hours               -Looking tired               -Eyelid drooping              -Head nodding (it could be too late at this point)              -Driving for more than 30 minutes     Some things you could do to make the driving safer if you are experiencing some drowsiness:              -Stop driving and rest              -Call for transportation              -Make sure your sleep disorder is adequately treated     Some things that have been shown NOT to work when experiencing drowsiness while driving:              -Turning on the radio              -Opening windows              -Eating any  distracting  /  entertaining  foods (e.g., sunflower seeds, candy, or any other)              -Talking on the phone      Your decision may not only impact your life, but also the life of others. Please, remember to drive safe for yourself and all of us.    Shan Liriano

## 2019-07-23 NOTE — PROGRESS NOTES
SLEEP MEDICINE CLINIC NOTE   [facility] SLEEP DISORDER CENTER  Nigel Diaz 54 year old male  : 1965  MRN: [id number]      PRIMARY CARE PROVIDER: Nestor Simmons    Visit Date:   2019      REASON FOR VISIT:  Evaluation of sleep apnea.      HISTORY OF PRESENT ILLNESS:  The patient is a very pleasant 54-year-old gentleman with history of paroxysmal atrial fibrillation.  He describes these symptoms occurring for the last several years; however, he was recently admitted to the hospital for a recurrence of atrial fibrillation with previous failed cardioversions and was suggested to undergo evaluation for obstructive sleep apnea.      He describes his current routine as going to bed around 10:30 p.m.  Prior to that, he is usually in his living room watching TV.  Once he gets into bed, it takes him about 20-30 minutes to fall asleep.  He attributes work-related stress as the primary reason for difficulty initiating sleep.  He reports waking up 2-3 times throughout the night either due to snoring or snort arousals; it is easy for him to return to sleep.  He stays in bed during these awakenings.  He finally wakes up with the help of an alarm between 4 and 4:30 a.m.  He does not feel rested upon awakening.  He does not have sleep inertia.  He reports excessive daytime sleepiness with an [place] Sleepiness Score of 12.  He reports mild to moderate chance of falling asleep while driving.  He works from 8-5 on weekdays.  On weekends, he generally sleeps in for about an extra hour and feels a little more rested.  He does not take any naps through the day.      He denies any features of motor restlessness suggestive of restless legs syndrome.  He denies any frequent dreams, nightmares, dream enactment behavior or bruxism.      He denies waking up with a headache in the morning.  He reports snoring which is loud in intensity.  He has frequent snort arousals and witnessed apneas.  He often wakes up with a dry  "mouth and has difficulty breathing through his nose but does not have ongoing GERD symptoms.  He prefers to sleep on his sides.      He denies any features of hypocretin deficiency including cataplexy, sleep paralysis, hypnagogic or hypnopompic hallucinations.      SOCIAL HISTORY:  The patient is , lives at home with his wife.  He denies active smoking.  He reports that he is a recovering alcoholic.  His last alcoholic drink was in February of this year.  He works in managing an advertisement agency.      FAMILY HISTORY:  Reports that his mother has congestive heart failure and recently had a pacemaker placed.  His father passed away from dementia, also had a history of coronary artery disease and used to snore.      PAST SURGICAL HISTORY:  Brachial cyst removal.      PAST MEDICAL HISTORY:  Atrial fibrillation, GERD, chronic back pain.      MEDICATIONS:     1.  Adalimumab for psoriasis and psoriatic arthritis.   2.  Diltiazem.   3.  Duloxetine for anxiety.   4.  Gabapentin.   5.  Multivitamins.   6.  Tramadol.   7.  Apixaban.      REVIEW OF SYSTEMS:  A complete 14-point review of systems was performed and found negative except as noted above.      PHYSICAL EXAMINATION:   /62   Pulse 89   Ht 1.981 m (6' 6\")   Wt 103 kg (227 lb)   SpO2 99%   BMI 26.23 kg/m    GENERAL:  Pleasant gentleman sitting comfortably and speaking full sentences without any discomfort.   HEENT:  Mallampati class 1 airway.  Neck circumference 40 cm.  No malocclusion noted.  Tonsils not visualized.  No obstruction to flow in the nares.   PULMONARY:  Normal intensity breath sounds bilaterally with no added sounds.   CARDIOVASCULAR:  S1, S2 normal with no murmur, rubs or gallops.  Regular rate and rhythm.   ABDOMEN:  Soft, nontender, nondistended, normoactive bowel sounds.   NEUROLOGIC:  Grossly intact.   PSYCHIATRIC:  Normal affect.   SKIN:  No rashes seen on limited exam.   MUSCULOSKELETAL:  No lower extremity edema or upper " extremity tremors.      ASSESSMENT AND PLAN:  The patient is a very pleasant 54-year-old gentleman who has been referred for evaluation of sleep-disordered breathing after failed cardioversion for paroxysmal atrial fibrillation as well as suggestive symptoms.  The patient has high pretest probability for obstructive sleep apnea.  We reviewed in detail the pathophysiology of CHRISTIANO as well as its various treatment options.  The patient is suggested to undergo a home sleep apnea test.  He will return to clinic after the test is completed to review the results and discuss treatment options.  He was advised not to drive or operate heavy machinery if drowsy or sleepy.  He was counseled regarding the importance of maintaining healthy weight.         I spent a total of 60 minutes face to face with Dilcia Diaz during today's office visit. Over 50% of this time was spent counseling the patient and/or coordinating care regarding their sleep disorder.     Shan Liriano MD   of Medicine  Pulmonary, Critical Care and Sleep Medicine  [facility]  Pager: [phone]                 D: 2019   T: 2019   MT: NOLAN      Name:     MARY ELLENNICOLEDILCIA   MRN:      [id number]        Account:      [id number]   :      1965           Visit Date:   2019      Document: [id number]

## 2019-07-23 NOTE — NURSING NOTE
Does Nigel have a CPAP/Bipap?  No     Weight management plan: Patient was referred to their PCP to discuss a diet and exercise plan.

## 2019-07-24 NOTE — PROGRESS NOTES
This HSAT was performed using a Noxturnal T3 device which recorded snore, sound, movement activity, body position, nasal pressure, oronasal thermal airflow, pulse, oximetry and both chest and abdominal respiratory effort. HSAT data was restricted to the time patient states they were in bed.     HSAT was scored using 1B 4% hypopnea rule.     AHI: 18.9. Snoring was reported as loud.  Time with SpO2 below 89% was 11.0 minutes.   Overall signal quality was good     Pt will follow up with sleep provider to determine appropriate therapy.     Ordering Deandra CASAS Umesh, MD Charles O. BA, [facility], [facility] Clinical Specialist 07/24/2019

## 2019-07-24 NOTE — PROGRESS NOTES
Patient is completing a home sleep test. Patient was instructed on how to put on the Noxturnal T3 device and associated equipment before going to bed and given the opportunity to practice putting it on before leaving the sleep center. Patient was reminded to bring the home sleep test kit back to the center tomorrow, at agreed upon time for download and reporting. .Lexi Christian

## 2019-07-25 NOTE — PHARMACY-ADMISSION MEDICATION HISTORY
Admission medication history interview status for the 6/7/2019  admission is complete. See EPIC admission navigator for prior to admission medications     Medication history source reliability:Good    Actions taken by pharmacist (provider contacted, etc): interviewed patient, he had his meds with him     Additional medication history information not noted on PTA med list :None    Medication reconciliation/reorder completed by provider prior to medication history? No    Time spent in this activity: 15 min    Prior to Admission medications    Medication Sig Last Dose Taking? Auth Provider   adalimumab (HUMIRA) 40 MG/0.8ML prefilled syringe kit Inject 40 mg Subcutaneous every 14 days 5/31/2019 Yes Unknown, Entered By History   DULoxetine (CYMBALTA) 30 MG capsule Take 30 mg by mouth 2 times daily 6/7/2019 at x2 Yes Unknown, Entered By History   gabapentin (NEURONTIN) 100 MG capsule Take 100 mg by mouth 3 times daily 6/7/2019 at x3 Yes Reported, Patient   multivitamin w/minerals (THERA-VIT-M) tablet Take 1 tablet by mouth daily 6/7/2019 at Unknown time Yes Unknown, Entered By History   traMADol (ULTRAM) 50 MG tablet Take 100 mg by mouth 2 times daily  6/7/2019 at x2 Yes Reported, Patient         
"I took some party drugs on Monday and Tuesday and I'm having some reaction to the drugs, I have a lot going on in my brain due to these drugs, I feel like my brain is moving slower than it should." No acute distress noted.

## 2019-08-02 ENCOUNTER — MYC MEDICAL ADVICE (OUTPATIENT)
Dept: SLEEP MEDICINE | Facility: CLINIC | Age: 54
End: 2019-08-02

## 2019-08-05 NOTE — TELEPHONE ENCOUNTER
Left message for patient to contact clinic regarding virtual visit 8/3/2019 @3:00 with Dr Liriano. Need verbal consent for visit. Virtual Authorization sheet needs to be completed.     Pretty YOUNG CMA

## 2019-08-09 NOTE — PROCEDURES
"HOME SLEEP STUDY INTERPRETATION    Patient: Nigel Diaz  MRN: [id number]  YOB: 1965  Study Date: 7/23/2019  Referring Provider: Nestor Simmons  Ordering Provider: Shan Liriano MD     Indications for Home Study: Nigel Diaz is a 54 year old male with a history of paroxysmal atrial fibrillation, psoriatic arthritis, anxiety disorder who presents with symptoms suggestive of obstructive sleep apnea.    Estimated body mass index is 26.23 kg/m  as calculated from the following:    Height as of an earlier encounter on 7/23/19: 1.981 m (6' 6\").    Weight as of an earlier encounter on 7/23/19: 103 kg (227 lb).  Total score - Satin: 12 (7/23/2019 10:00 AM)  StopBang Total Score: 5 (7/23/2019 10:00 AM)    Data: A full night home sleep study was performed recording the standard physiologic parameters including body position, movement, sound, nasal pressure, thermal oral airflow, chest and abdominal movements with respiratory inductance plethysmography, and oxygen saturation by pulse oximetry. Pulse rate was estimated by oximetry recording. This study was considered adequate based on > 4 hours of quality oximetry and respiratory recording. As specified by the AASM Manual for the Scoring of Sleep and Associated events, version 2.3, Rule VIII.D 1B, 4% oxygen desaturation scoring for hypopneas is used as a standard of care on all home sleep apnea testing.    Analysis Time:  313 minutes    Respiration:   Sleep Associated Hypoxemia: sustained hypoxemia was present. Baseline oxygen saturation was 94%.  Time with saturation less than or equal to 88% was 11 minutes. The lowest oxygen saturation was 77%.   Snoring: Snoring was present - loud and continuous.  Respiratory events: The home study revealed a presence of 38 obstructive apneas and 28 mixed and central apneas. There were 33 hypopneas resulting in a combined apnea/hypopnea index [AHI] of 18.9 events per hour.  AHI was 48.6 per hour supine, 2.7 per hour on " left side.   Pattern: Excluding events noted above, respiratory rate and pattern was Normal.    Position: Percent of time spent: supine - 35%, prone - 0%, on left - 65%, on right - 0%.    Heart Rate: By pulse oximetry normal rate was noted.     Assessment:   Moderate obstructive sleep apnea.  Sleep associated hypoxemia was present.    Recommendations:  Consider auto-CPAP at 5-15 cmH2O, oral appliance therapy or positional therapy.  Suggest optimizing sleep hygiene and avoiding sleep deprivation.  Weight management.    Diagnosis Code(s): Obstructive Sleep Apnea G47.33, Hypoxemia G47.36, Snoring R06.83    Shan Liriano MD, August 9, 2019   Diplomate, American Board of Internal Medicine, Sleep Medicine

## 2019-08-15 ENCOUNTER — TELEPHONE (OUTPATIENT)
Dept: SLEEP MEDICINE | Facility: CLINIC | Age: 54
End: 2019-08-15

## 2019-08-15 NOTE — TELEPHONE ENCOUNTER
patient is very dissatisfied that he has not gotten his sleep results yet.  When I explain that he has had 2 visits that were canceled or no showed he did not have an answer for that.  He wants results called to him next week when Dr. Liriano is in.  He is getting his records release to him to read the results electronically.      Please call him as soon as possible.

## 2019-08-20 ENCOUNTER — DOCUMENTATION ONLY (OUTPATIENT)
Dept: SLEEP MEDICINE | Facility: CLINIC | Age: 54
End: 2019-08-20

## 2019-08-20 NOTE — PROGRESS NOTES
I called the patient's preferred phone number listed in the chart today but the call was not answered. Over the last 1 month, our clinic staff has attempted multiple times to communicate with the patient to schedule either an in person appointment or phone (virtual) appointment to discuss home sleep study results but the patient has stated his inability to be available on all the suggested times. We will continue to reach out to him again to schedule a clinic appointment.    Shan Liriano

## 2019-08-28 ENCOUNTER — DOCUMENTATION ONLY (OUTPATIENT)
Dept: SLEEP MEDICINE | Facility: CLINIC | Age: 54
End: 2019-08-28

## 2019-08-28 NOTE — PROGRESS NOTES
Called patient again today to discuss results and follow up. He did not answer the call. Given no clear instructions about leaving a voice mail, no message was left. Clinic staff was requested to continue attempts to reach the patient.     Shan Liriano MD

## 2019-08-29 ENCOUNTER — TELEPHONE (OUTPATIENT)
Dept: SLEEP MEDICINE | Facility: CLINIC | Age: 54
End: 2019-08-29

## 2019-08-29 NOTE — TELEPHONE ENCOUNTER
Left message for patient to contact office for his sleep results. Sent his MADISON to [facility] department [phone].  Patient is requesting that records be released to him by 8/30/19 I left a message stating that his MADISON has been sent to the [facility] department     Cezar PRESSLEY

## 2019-09-03 ENCOUNTER — TELEPHONE (OUTPATIENT)
Dept: SLEEP MEDICINE | Facility: CLINIC | Age: 54
End: 2019-09-03

## 2019-09-03 NOTE — TELEPHONE ENCOUNTER
Left message for patient to contact clinic regarding sleep study results, our office has made many attempts to reach the patient to go over results.   Today I also reached out to Radha his emergency contact to verify that we have the correct phone number for this patient. I left a message with her to contact the sleep center with this information.     Pretty LESTER CMA

## 2019-10-27 ENCOUNTER — HEALTH MAINTENANCE LETTER (OUTPATIENT)
Age: 54
End: 2019-10-27

## 2021-01-10 ENCOUNTER — HEALTH MAINTENANCE LETTER (OUTPATIENT)
Age: 56
End: 2021-01-10

## 2021-10-23 ENCOUNTER — HEALTH MAINTENANCE LETTER (OUTPATIENT)
Age: 56
End: 2021-10-23

## 2022-02-12 ENCOUNTER — HEALTH MAINTENANCE LETTER (OUTPATIENT)
Age: 57
End: 2022-02-12

## 2022-10-09 ENCOUNTER — HEALTH MAINTENANCE LETTER (OUTPATIENT)
Age: 57
End: 2022-10-09

## 2022-12-19 ENCOUNTER — LAB REQUISITION (OUTPATIENT)
Dept: LAB | Facility: CLINIC | Age: 57
End: 2022-12-19

## 2022-12-19 DIAGNOSIS — B95.1 STREPTOCOCCUS, GROUP B, AS THE CAUSE OF DISEASES CLASSIFIED ELSEWHERE: ICD-10-CM

## 2022-12-19 LAB
AST SERPL W P-5'-P-CCNC: 34 U/L (ref 0–45)
BASOPHILS # BLD AUTO: 0.1 10E3/UL (ref 0–0.2)
BASOPHILS NFR BLD AUTO: 1 %
BUN SERPL-MCNC: 10 MG/DL (ref 7–30)
CREAT SERPL-MCNC: 0.72 MG/DL (ref 0.66–1.25)
CRP SERPL-MCNC: 125 MG/L (ref 0–8)
EOSINOPHIL # BLD AUTO: 0.4 10E3/UL (ref 0–0.7)
EOSINOPHIL NFR BLD AUTO: 3 %
ERYTHROCYTE [DISTWIDTH] IN BLOOD BY AUTOMATED COUNT: 14.4 % (ref 10–15)
ERYTHROCYTE [SEDIMENTATION RATE] IN BLOOD BY WESTERGREN METHOD: 42 MM/HR (ref 0–20)
GFR SERPL CREATININE-BSD FRML MDRD: >90 ML/MIN/1.73M2
HCT VFR BLD AUTO: 30.7 % (ref 40–53)
HGB BLD-MCNC: 9.4 G/DL (ref 13.3–17.7)
IMM GRANULOCYTES # BLD: 0.2 10E3/UL
IMM GRANULOCYTES NFR BLD: 2 %
LYMPHOCYTES # BLD AUTO: 2.9 10E3/UL (ref 0.8–5.3)
LYMPHOCYTES NFR BLD AUTO: 27 %
MCH RBC QN AUTO: 28.2 PG (ref 26.5–33)
MCHC RBC AUTO-ENTMCNC: 30.6 G/DL (ref 31.5–36.5)
MCV RBC AUTO: 92 FL (ref 78–100)
MONOCYTES # BLD AUTO: 1.2 10E3/UL (ref 0–1.3)
MONOCYTES NFR BLD AUTO: 11 %
NEUTROPHILS # BLD AUTO: 6 10E3/UL (ref 1.6–8.3)
NEUTROPHILS NFR BLD AUTO: 56 %
NRBC # BLD AUTO: 0 10E3/UL
NRBC BLD AUTO-RTO: 0 /100
PLATELET # BLD AUTO: 488 10E3/UL (ref 150–450)
RBC # BLD AUTO: 3.33 10E6/UL (ref 4.4–5.9)
WBC # BLD AUTO: 10.7 10E3/UL (ref 4–11)

## 2022-12-19 PROCEDURE — 82565 ASSAY OF CREATININE: CPT | Performed by: INTERNAL MEDICINE

## 2022-12-19 PROCEDURE — 85652 RBC SED RATE AUTOMATED: CPT | Performed by: INTERNAL MEDICINE

## 2022-12-19 PROCEDURE — 86140 C-REACTIVE PROTEIN: CPT | Performed by: INTERNAL MEDICINE

## 2022-12-19 PROCEDURE — 84450 TRANSFERASE (AST) (SGOT): CPT | Performed by: INTERNAL MEDICINE

## 2022-12-19 PROCEDURE — 85025 COMPLETE CBC W/AUTO DIFF WBC: CPT | Performed by: INTERNAL MEDICINE

## 2022-12-19 PROCEDURE — 84520 ASSAY OF UREA NITROGEN: CPT | Performed by: INTERNAL MEDICINE

## 2022-12-26 ENCOUNTER — LAB REQUISITION (OUTPATIENT)
Dept: LAB | Facility: CLINIC | Age: 57
End: 2022-12-26

## 2022-12-26 DIAGNOSIS — B95.1 STREPTOCOCCUS, GROUP B, AS THE CAUSE OF DISEASES CLASSIFIED ELSEWHERE: ICD-10-CM

## 2022-12-26 LAB
AST SERPL W P-5'-P-CCNC: 15 U/L (ref 0–45)
BASOPHILS # BLD AUTO: 0.1 10E3/UL (ref 0–0.2)
BASOPHILS NFR BLD AUTO: 1 %
BUN SERPL-MCNC: 8 MG/DL (ref 7–30)
CREAT SERPL-MCNC: 0.67 MG/DL (ref 0.66–1.25)
CRP SERPL-MCNC: 16.7 MG/L (ref 0–8)
EOSINOPHIL # BLD AUTO: 0.1 10E3/UL (ref 0–0.7)
EOSINOPHIL NFR BLD AUTO: 1 %
ERYTHROCYTE [DISTWIDTH] IN BLOOD BY AUTOMATED COUNT: 13.7 % (ref 10–15)
ERYTHROCYTE [SEDIMENTATION RATE] IN BLOOD BY WESTERGREN METHOD: 61 MM/HR (ref 0–20)
GFR SERPL CREATININE-BSD FRML MDRD: >90 ML/MIN/1.73M2
HCT VFR BLD AUTO: 30.6 % (ref 40–53)
HGB BLD-MCNC: 9.4 G/DL (ref 13.3–17.7)
IMM GRANULOCYTES # BLD: 0 10E3/UL
IMM GRANULOCYTES NFR BLD: 0 %
LYMPHOCYTES # BLD AUTO: 2.4 10E3/UL (ref 0.8–5.3)
LYMPHOCYTES NFR BLD AUTO: 26 %
MCH RBC QN AUTO: 27.5 PG (ref 26.5–33)
MCHC RBC AUTO-ENTMCNC: 30.7 G/DL (ref 31.5–36.5)
MCV RBC AUTO: 90 FL (ref 78–100)
MONOCYTES # BLD AUTO: 0.7 10E3/UL (ref 0–1.3)
MONOCYTES NFR BLD AUTO: 8 %
NEUTROPHILS # BLD AUTO: 5.9 10E3/UL (ref 1.6–8.3)
NEUTROPHILS NFR BLD AUTO: 64 %
NRBC # BLD AUTO: 0 10E3/UL
NRBC BLD AUTO-RTO: 0 /100
PLATELET # BLD AUTO: 626 10E3/UL (ref 150–450)
RBC # BLD AUTO: 3.42 10E6/UL (ref 4.4–5.9)
WBC # BLD AUTO: 9.4 10E3/UL (ref 4–11)

## 2022-12-26 PROCEDURE — 85652 RBC SED RATE AUTOMATED: CPT | Performed by: INTERNAL MEDICINE

## 2022-12-26 PROCEDURE — 86140 C-REACTIVE PROTEIN: CPT | Performed by: INTERNAL MEDICINE

## 2022-12-26 PROCEDURE — 84450 TRANSFERASE (AST) (SGOT): CPT | Performed by: INTERNAL MEDICINE

## 2022-12-26 PROCEDURE — 84520 ASSAY OF UREA NITROGEN: CPT | Performed by: INTERNAL MEDICINE

## 2022-12-26 PROCEDURE — 82565 ASSAY OF CREATININE: CPT | Performed by: INTERNAL MEDICINE

## 2022-12-26 PROCEDURE — 85025 COMPLETE CBC W/AUTO DIFF WBC: CPT | Performed by: INTERNAL MEDICINE

## 2023-01-02 ENCOUNTER — LAB REQUISITION (OUTPATIENT)
Dept: LAB | Facility: CLINIC | Age: 58
End: 2023-01-02
Payer: COMMERCIAL

## 2023-01-02 DIAGNOSIS — B95.1 STREPTOCOCCUS, GROUP B, AS THE CAUSE OF DISEASES CLASSIFIED ELSEWHERE: ICD-10-CM

## 2023-01-02 LAB
AST SERPL W P-5'-P-CCNC: 25 U/L (ref 0–45)
BASOPHILS # BLD AUTO: 0.1 10E3/UL (ref 0–0.2)
BASOPHILS NFR BLD AUTO: 2 %
BUN SERPL-MCNC: 12 MG/DL (ref 7–30)
CREAT SERPL-MCNC: 0.63 MG/DL (ref 0.66–1.25)
CRP SERPL-MCNC: 28.6 MG/L (ref 0–8)
EOSINOPHIL # BLD AUTO: 0.4 10E3/UL (ref 0–0.7)
EOSINOPHIL NFR BLD AUTO: 7 %
ERYTHROCYTE [DISTWIDTH] IN BLOOD BY AUTOMATED COUNT: 13.8 % (ref 10–15)
ERYTHROCYTE [SEDIMENTATION RATE] IN BLOOD BY WESTERGREN METHOD: 36 MM/HR (ref 0–20)
FERRITIN SERPL-MCNC: 16 NG/ML (ref 26–388)
GFR SERPL CREATININE-BSD FRML MDRD: >90 ML/MIN/1.73M2
HCT VFR BLD AUTO: 36.3 % (ref 40–53)
HGB BLD-MCNC: 11.3 G/DL (ref 13.3–17.7)
IMM GRANULOCYTES # BLD: 0 10E3/UL
IMM GRANULOCYTES NFR BLD: 0 %
IRON SATN MFR SERPL: 7 % (ref 15–46)
IRON SERPL-MCNC: 25 UG/DL (ref 35–180)
LYMPHOCYTES # BLD AUTO: 1.1 10E3/UL (ref 0.8–5.3)
LYMPHOCYTES NFR BLD AUTO: 20 %
MCH RBC QN AUTO: 27.4 PG (ref 26.5–33)
MCHC RBC AUTO-ENTMCNC: 31.1 G/DL (ref 31.5–36.5)
MCV RBC AUTO: 88 FL (ref 78–100)
MONOCYTES # BLD AUTO: 0.9 10E3/UL (ref 0–1.3)
MONOCYTES NFR BLD AUTO: 18 %
NEUTROPHILS # BLD AUTO: 2.8 10E3/UL (ref 1.6–8.3)
NEUTROPHILS NFR BLD AUTO: 53 %
NRBC # BLD AUTO: 0 10E3/UL
NRBC BLD AUTO-RTO: 0 /100
PLATELET # BLD AUTO: 323 10E3/UL (ref 150–450)
RBC # BLD AUTO: 4.13 10E6/UL (ref 4.4–5.9)
TIBC SERPL-MCNC: 335 UG/DL (ref 240–430)
TRANSFERRIN SERPL-MCNC: 272 MG/DL (ref 200–360)
WBC # BLD AUTO: 5.3 10E3/UL (ref 4–11)

## 2023-01-02 PROCEDURE — 84450 TRANSFERASE (AST) (SGOT): CPT | Performed by: INTERNAL MEDICINE

## 2023-01-02 PROCEDURE — 86140 C-REACTIVE PROTEIN: CPT | Performed by: INTERNAL MEDICINE

## 2023-01-02 PROCEDURE — 84466 ASSAY OF TRANSFERRIN: CPT | Performed by: INTERNAL MEDICINE

## 2023-01-02 PROCEDURE — 82728 ASSAY OF FERRITIN: CPT | Performed by: INTERNAL MEDICINE

## 2023-01-02 PROCEDURE — 85025 COMPLETE CBC W/AUTO DIFF WBC: CPT | Performed by: INTERNAL MEDICINE

## 2023-01-02 PROCEDURE — 84520 ASSAY OF UREA NITROGEN: CPT | Performed by: INTERNAL MEDICINE

## 2023-01-02 PROCEDURE — 83550 IRON BINDING TEST: CPT | Performed by: INTERNAL MEDICINE

## 2023-01-02 PROCEDURE — 85652 RBC SED RATE AUTOMATED: CPT | Performed by: INTERNAL MEDICINE

## 2023-01-02 PROCEDURE — 82565 ASSAY OF CREATININE: CPT | Performed by: INTERNAL MEDICINE

## 2023-01-09 ENCOUNTER — LAB REQUISITION (OUTPATIENT)
Dept: LAB | Facility: CLINIC | Age: 58
End: 2023-01-09
Payer: COMMERCIAL

## 2023-01-09 LAB
AST SERPL W P-5'-P-CCNC: 59 U/L (ref 0–45)
BASOPHILS # BLD AUTO: 0.1 10E3/UL (ref 0–0.2)
BASOPHILS NFR BLD AUTO: 1 %
BUN SERPL-MCNC: 8 MG/DL (ref 7–30)
CREAT SERPL-MCNC: 0.6 MG/DL (ref 0.66–1.25)
CRP SERPL-MCNC: 36.3 MG/L (ref 0–8)
EOSINOPHIL # BLD AUTO: 1.1 10E3/UL (ref 0–0.7)
EOSINOPHIL NFR BLD AUTO: 26 %
ERYTHROCYTE [DISTWIDTH] IN BLOOD BY AUTOMATED COUNT: 13.6 % (ref 10–15)
ERYTHROCYTE [SEDIMENTATION RATE] IN BLOOD BY WESTERGREN METHOD: 33 MM/HR (ref 0–20)
GFR SERPL CREATININE-BSD FRML MDRD: >90 ML/MIN/1.73M2
HCT VFR BLD AUTO: 32.4 % (ref 40–53)
HGB BLD-MCNC: 10.1 G/DL (ref 13.3–17.7)
IMM GRANULOCYTES # BLD: 0 10E3/UL
IMM GRANULOCYTES NFR BLD: 0 %
LYMPHOCYTES # BLD AUTO: 1 10E3/UL (ref 0.8–5.3)
LYMPHOCYTES NFR BLD AUTO: 25 %
MCH RBC QN AUTO: 26.9 PG (ref 26.5–33)
MCHC RBC AUTO-ENTMCNC: 31.2 G/DL (ref 31.5–36.5)
MCV RBC AUTO: 86 FL (ref 78–100)
MONOCYTES # BLD AUTO: 0.6 10E3/UL (ref 0–1.3)
MONOCYTES NFR BLD AUTO: 15 %
NEUTROPHILS # BLD AUTO: 1.4 10E3/UL (ref 1.6–8.3)
NEUTROPHILS NFR BLD AUTO: 33 %
NRBC # BLD AUTO: 0 10E3/UL
NRBC BLD AUTO-RTO: 0 /100
PLATELET # BLD AUTO: 153 10E3/UL (ref 150–450)
RBC # BLD AUTO: 3.76 10E6/UL (ref 4.4–5.9)
WBC # BLD AUTO: 4.1 10E3/UL (ref 4–11)

## 2023-01-09 PROCEDURE — 84450 TRANSFERASE (AST) (SGOT): CPT | Performed by: INTERNAL MEDICINE

## 2023-01-09 PROCEDURE — 82565 ASSAY OF CREATININE: CPT | Performed by: INTERNAL MEDICINE

## 2023-01-09 PROCEDURE — 85652 RBC SED RATE AUTOMATED: CPT | Performed by: INTERNAL MEDICINE

## 2023-01-09 PROCEDURE — 85025 COMPLETE CBC W/AUTO DIFF WBC: CPT | Performed by: INTERNAL MEDICINE

## 2023-01-09 PROCEDURE — 84520 ASSAY OF UREA NITROGEN: CPT | Performed by: INTERNAL MEDICINE

## 2023-01-09 PROCEDURE — 86140 C-REACTIVE PROTEIN: CPT | Performed by: INTERNAL MEDICINE

## 2023-01-12 ENCOUNTER — LAB REQUISITION (OUTPATIENT)
Dept: LAB | Facility: CLINIC | Age: 58
End: 2023-01-12
Payer: COMMERCIAL

## 2023-01-12 LAB
CREAT SERPL-MCNC: 0.55 MG/DL (ref 0.66–1.25)
GFR SERPL CREATININE-BSD FRML MDRD: >90 ML/MIN/1.73M2
HOLD SPECIMEN: NORMAL
VANCOMYCIN SERPL-MCNC: 6.5 MG/L

## 2023-01-12 PROCEDURE — 80202 ASSAY OF VANCOMYCIN: CPT | Performed by: INTERNAL MEDICINE

## 2023-01-12 PROCEDURE — 82565 ASSAY OF CREATININE: CPT | Performed by: INTERNAL MEDICINE

## 2023-01-17 ENCOUNTER — LAB REQUISITION (OUTPATIENT)
Dept: LAB | Facility: CLINIC | Age: 58
End: 2023-01-17
Payer: COMMERCIAL

## 2023-01-17 DIAGNOSIS — B95.1 STREPTOCOCCUS, GROUP B, AS THE CAUSE OF DISEASES CLASSIFIED ELSEWHERE: ICD-10-CM

## 2023-01-17 LAB
AST SERPL W P-5'-P-CCNC: 19 U/L (ref 10–50)
BASOPHILS # BLD AUTO: 0.1 10E3/UL (ref 0–0.2)
BASOPHILS NFR BLD AUTO: 2 %
BUN SERPL-MCNC: 8.4 MG/DL (ref 6–20)
CREAT SERPL-MCNC: 0.63 MG/DL (ref 0.67–1.17)
CRP SERPL-MCNC: 16.02 MG/L
EOSINOPHIL # BLD AUTO: 0.2 10E3/UL (ref 0–0.7)
EOSINOPHIL NFR BLD AUTO: 2 %
ERYTHROCYTE [DISTWIDTH] IN BLOOD BY AUTOMATED COUNT: 14.9 % (ref 10–15)
ERYTHROCYTE [SEDIMENTATION RATE] IN BLOOD BY WESTERGREN METHOD: 20 MM/HR (ref 0–20)
GFR SERPL CREATININE-BSD FRML MDRD: >90 ML/MIN/1.73M2
HCT VFR BLD AUTO: 37.1 % (ref 40–53)
HGB BLD-MCNC: 11.2 G/DL (ref 13.3–17.7)
IMM GRANULOCYTES # BLD: 0.1 10E3/UL
IMM GRANULOCYTES NFR BLD: 1 %
LYMPHOCYTES # BLD AUTO: 2 10E3/UL (ref 0.8–5.3)
LYMPHOCYTES NFR BLD AUTO: 30 %
MCH RBC QN AUTO: 26.5 PG (ref 26.5–33)
MCHC RBC AUTO-ENTMCNC: 30.2 G/DL (ref 31.5–36.5)
MCV RBC AUTO: 88 FL (ref 78–100)
MONOCYTES # BLD AUTO: 1 10E3/UL (ref 0–1.3)
MONOCYTES NFR BLD AUTO: 15 %
NEUTROPHILS # BLD AUTO: 3.4 10E3/UL (ref 1.6–8.3)
NEUTROPHILS NFR BLD AUTO: 50 %
NRBC # BLD AUTO: 0 10E3/UL
NRBC BLD AUTO-RTO: 0 /100
PLATELET # BLD AUTO: 499 10E3/UL (ref 150–450)
RBC # BLD AUTO: 4.23 10E6/UL (ref 4.4–5.9)
VANCOMYCIN SERPL-MCNC: 11.6 UG/ML
WBC # BLD AUTO: 6.7 10E3/UL (ref 4–11)

## 2023-01-17 PROCEDURE — 86140 C-REACTIVE PROTEIN: CPT | Performed by: INTERNAL MEDICINE

## 2023-01-17 PROCEDURE — 36592 COLLECT BLOOD FROM PICC: CPT | Performed by: INTERNAL MEDICINE

## 2023-01-17 PROCEDURE — 80202 ASSAY OF VANCOMYCIN: CPT | Performed by: INTERNAL MEDICINE

## 2023-01-17 PROCEDURE — 84520 ASSAY OF UREA NITROGEN: CPT | Performed by: INTERNAL MEDICINE

## 2023-01-17 PROCEDURE — 84450 TRANSFERASE (AST) (SGOT): CPT | Performed by: INTERNAL MEDICINE

## 2023-01-17 PROCEDURE — 82565 ASSAY OF CREATININE: CPT | Performed by: INTERNAL MEDICINE

## 2023-01-17 PROCEDURE — 85025 COMPLETE CBC W/AUTO DIFF WBC: CPT | Performed by: INTERNAL MEDICINE

## 2023-01-17 PROCEDURE — 85652 RBC SED RATE AUTOMATED: CPT | Performed by: INTERNAL MEDICINE

## 2023-01-19 ENCOUNTER — LAB REQUISITION (OUTPATIENT)
Dept: LAB | Facility: CLINIC | Age: 58
End: 2023-01-19
Payer: COMMERCIAL

## 2023-01-19 DIAGNOSIS — B95.1 STREPTOCOCCUS, GROUP B, AS THE CAUSE OF DISEASES CLASSIFIED ELSEWHERE: ICD-10-CM

## 2023-01-19 LAB
CREAT SERPL-MCNC: 0.67 MG/DL (ref 0.67–1.17)
GFR SERPL CREATININE-BSD FRML MDRD: >90 ML/MIN/1.73M2
VANCOMYCIN SERPL-MCNC: 14.4 UG/ML

## 2023-01-19 PROCEDURE — 80202 ASSAY OF VANCOMYCIN: CPT | Performed by: INTERNAL MEDICINE

## 2023-01-19 PROCEDURE — 82565 ASSAY OF CREATININE: CPT | Performed by: INTERNAL MEDICINE

## 2023-01-23 ENCOUNTER — LAB REQUISITION (OUTPATIENT)
Dept: LAB | Facility: CLINIC | Age: 58
End: 2023-01-23
Payer: COMMERCIAL

## 2023-01-23 DIAGNOSIS — B95.1 STREPTOCOCCUS, GROUP B, AS THE CAUSE OF DISEASES CLASSIFIED ELSEWHERE: ICD-10-CM

## 2023-01-23 LAB
AST SERPL W P-5'-P-CCNC: 23 U/L (ref 10–50)
BASOPHILS # BLD AUTO: 0.1 10E3/UL (ref 0–0.2)
BASOPHILS NFR BLD AUTO: 2 %
BUN SERPL-MCNC: 10.9 MG/DL (ref 6–20)
CREAT SERPL-MCNC: 0.72 MG/DL (ref 0.67–1.17)
CRP SERPL-MCNC: 4.05 MG/L
EOSINOPHIL # BLD AUTO: 0.1 10E3/UL (ref 0–0.7)
EOSINOPHIL NFR BLD AUTO: 3 %
ERYTHROCYTE [DISTWIDTH] IN BLOOD BY AUTOMATED COUNT: 15 % (ref 10–15)
ERYTHROCYTE [SEDIMENTATION RATE] IN BLOOD BY WESTERGREN METHOD: 17 MM/HR (ref 0–20)
GFR SERPL CREATININE-BSD FRML MDRD: >90 ML/MIN/1.73M2
HCT VFR BLD AUTO: 36.6 % (ref 40–53)
HGB BLD-MCNC: 11.2 G/DL (ref 13.3–17.7)
IMM GRANULOCYTES # BLD: 0 10E3/UL
IMM GRANULOCYTES NFR BLD: 0 %
LYMPHOCYTES # BLD AUTO: 1.7 10E3/UL (ref 0.8–5.3)
LYMPHOCYTES NFR BLD AUTO: 30 %
MCH RBC QN AUTO: 26.9 PG (ref 26.5–33)
MCHC RBC AUTO-ENTMCNC: 30.6 G/DL (ref 31.5–36.5)
MCV RBC AUTO: 88 FL (ref 78–100)
MONOCYTES # BLD AUTO: 0.6 10E3/UL (ref 0–1.3)
MONOCYTES NFR BLD AUTO: 11 %
NEUTROPHILS # BLD AUTO: 3.1 10E3/UL (ref 1.6–8.3)
NEUTROPHILS NFR BLD AUTO: 54 %
NRBC # BLD AUTO: 0 10E3/UL
NRBC BLD AUTO-RTO: 0 /100
PLATELET # BLD AUTO: 402 10E3/UL (ref 150–450)
RBC # BLD AUTO: 4.16 10E6/UL (ref 4.4–5.9)
VANCOMYCIN SERPL-MCNC: 14.6 UG/ML
WBC # BLD AUTO: 5.7 10E3/UL (ref 4–11)

## 2023-01-23 PROCEDURE — 84450 TRANSFERASE (AST) (SGOT): CPT | Performed by: INTERNAL MEDICINE

## 2023-01-23 PROCEDURE — 83540 ASSAY OF IRON: CPT | Performed by: INTERNAL MEDICINE

## 2023-01-23 PROCEDURE — 82565 ASSAY OF CREATININE: CPT | Performed by: INTERNAL MEDICINE

## 2023-01-23 PROCEDURE — 85652 RBC SED RATE AUTOMATED: CPT | Performed by: INTERNAL MEDICINE

## 2023-01-23 PROCEDURE — 85025 COMPLETE CBC W/AUTO DIFF WBC: CPT | Performed by: INTERNAL MEDICINE

## 2023-01-23 PROCEDURE — 84520 ASSAY OF UREA NITROGEN: CPT | Performed by: INTERNAL MEDICINE

## 2023-01-23 PROCEDURE — 80202 ASSAY OF VANCOMYCIN: CPT | Performed by: INTERNAL MEDICINE

## 2023-01-23 PROCEDURE — 86140 C-REACTIVE PROTEIN: CPT | Performed by: INTERNAL MEDICINE

## 2023-01-24 LAB — IRON SERPL-MCNC: 62 UG/DL (ref 61–157)

## 2023-02-18 ENCOUNTER — HEALTH MAINTENANCE LETTER (OUTPATIENT)
Age: 58
End: 2023-02-18

## 2024-03-16 ENCOUNTER — HEALTH MAINTENANCE LETTER (OUTPATIENT)
Age: 59
End: 2024-03-16

## (undated) RX ORDER — LIDOCAINE HYDROCHLORIDE 40 MG/ML
SOLUTION TOPICAL
Status: DISPENSED
Start: 2019-06-10

## (undated) RX ORDER — NALOXONE HYDROCHLORIDE 0.4 MG/ML
INJECTION, SOLUTION INTRAMUSCULAR; INTRAVENOUS; SUBCUTANEOUS
Status: DISPENSED
Start: 2019-06-10

## (undated) RX ORDER — GLYCOPYRROLATE 0.2 MG/ML
INJECTION, SOLUTION INTRAMUSCULAR; INTRAVENOUS
Status: DISPENSED
Start: 2019-06-10

## (undated) RX ORDER — FENTANYL CITRATE 50 UG/ML
INJECTION, SOLUTION INTRAMUSCULAR; INTRAVENOUS
Status: DISPENSED
Start: 2019-06-10

## (undated) RX ORDER — FLUMAZENIL 0.1 MG/ML
INJECTION, SOLUTION INTRAVENOUS
Status: DISPENSED
Start: 2019-06-10